# Patient Record
Sex: FEMALE | Race: BLACK OR AFRICAN AMERICAN | NOT HISPANIC OR LATINO | Employment: UNEMPLOYED | URBAN - METROPOLITAN AREA
[De-identification: names, ages, dates, MRNs, and addresses within clinical notes are randomized per-mention and may not be internally consistent; named-entity substitution may affect disease eponyms.]

---

## 2018-12-06 ENCOUNTER — HOSPITAL ENCOUNTER (EMERGENCY)
Facility: HOSPITAL | Age: 28
Discharge: HOME | End: 2018-12-06
Attending: EMERGENCY MEDICINE

## 2018-12-06 VITALS
DIASTOLIC BLOOD PRESSURE: 78 MMHG | WEIGHT: 130.06 LBS | RESPIRATION RATE: 18 BRPM | OXYGEN SATURATION: 100 % | TEMPERATURE: 99 F | SYSTOLIC BLOOD PRESSURE: 133 MMHG | HEIGHT: 62 IN | HEART RATE: 108 BPM | BODY MASS INDEX: 23.93 KG/M2

## 2018-12-06 DIAGNOSIS — K52.9 GASTROENTERITIS: Primary | ICD-10-CM

## 2018-12-06 LAB
DEPRECATED S PYO AG THROAT QL EIA: NEGATIVE
INFLUENZA A, MOLECULAR: NEGATIVE
INFLUENZA B, MOLECULAR: NEGATIVE
SPECIMEN SOURCE: NORMAL

## 2018-12-06 PROCEDURE — 87081 CULTURE SCREEN ONLY: CPT

## 2018-12-06 PROCEDURE — 87880 STREP A ASSAY W/OPTIC: CPT

## 2018-12-06 PROCEDURE — 99283 EMERGENCY DEPT VISIT LOW MDM: CPT

## 2018-12-06 PROCEDURE — 87502 INFLUENZA DNA AMP PROBE: CPT

## 2018-12-06 PROCEDURE — 25000003 PHARM REV CODE 250: Performed by: PHYSICIAN ASSISTANT

## 2018-12-06 RX ORDER — ONDANSETRON 4 MG/1
4 TABLET, ORALLY DISINTEGRATING ORAL ONCE
Status: COMPLETED | OUTPATIENT
Start: 2018-12-06 | End: 2018-12-06

## 2018-12-06 RX ORDER — PROMETHAZINE HYDROCHLORIDE 25 MG/1
25 TABLET ORAL EVERY 6 HOURS PRN
Qty: 15 TABLET | Refills: 0 | Status: ON HOLD | OUTPATIENT
Start: 2018-12-06 | End: 2019-03-15 | Stop reason: HOSPADM

## 2018-12-06 RX ORDER — IBUPROFEN 800 MG/1
800 TABLET ORAL
Status: COMPLETED | OUTPATIENT
Start: 2018-12-06 | End: 2018-12-06

## 2018-12-06 RX ORDER — DICYCLOMINE HYDROCHLORIDE 20 MG/1
20 TABLET ORAL 2 TIMES DAILY
Qty: 20 TABLET | Refills: 0 | Status: SHIPPED | OUTPATIENT
Start: 2018-12-06 | End: 2019-01-05

## 2018-12-06 RX ADMIN — ONDANSETRON 4 MG: 4 TABLET, ORALLY DISINTEGRATING ORAL at 09:12

## 2018-12-06 RX ADMIN — IBUPROFEN 800 MG: 800 TABLET ORAL at 09:12

## 2018-12-06 NOTE — ED PROVIDER NOTES
Encounter Date: 12/6/2018       History     Chief Complaint   Patient presents with    Generalized Body Aches     and fever sicne yesterday     The history is provided by the patient.   Emesis    This is a new problem. The current episode started yesterday. The problem occurs 2 - 4 times per day. The problem has been unchanged. The emesis has an appearance of stomach contents. Associated symptoms include chills, diarrhea, a fever and myalgias. Pertinent negatives include no abdominal pain, no arthralgias, no cough, no headaches, no sweats and no URI. Risk factors include ill contacts.     Review of patient's allergies indicates:  No Known Allergies  No past medical history on file.  No past surgical history on file.  No family history on file.  Social History     Tobacco Use    Smoking status: Not on file   Substance Use Topics    Alcohol use: Not on file    Drug use: Not on file     Review of Systems   Constitutional: Positive for chills and fever.   HENT: Negative for sore throat.    Eyes: Negative for photophobia and redness.   Respiratory: Negative for cough and shortness of breath.    Cardiovascular: Negative for chest pain.   Gastrointestinal: Positive for diarrhea and vomiting. Negative for abdominal pain and nausea.   Endocrine: Negative for polydipsia and polyphagia.   Genitourinary: Negative for dysuria.   Musculoskeletal: Positive for myalgias. Negative for arthralgias and back pain.   Skin: Negative for rash.   Neurological: Negative for weakness and headaches.   Hematological: Does not bruise/bleed easily.   Psychiatric/Behavioral: The patient is not nervous/anxious.    All other systems reviewed and are negative.      Physical Exam     Initial Vitals [12/06/18 0930]   BP Pulse Resp Temp SpO2   133/78 (!) 119 18 100.1 °F (37.8 °C) 100 %      MAP       --         Physical Exam    Nursing note and vitals reviewed.  Constitutional: Vital signs are normal. She appears well-developed and well-nourished.  No distress.   HENT:   Head: Normocephalic and atraumatic.   Right Ear: External ear normal.   Left Ear: External ear normal.   Nose: Nose normal.   Mouth/Throat: Oropharynx is clear and moist.   Eyes: Conjunctivae, EOM and lids are normal. Pupils are equal, round, and reactive to light.   Neck: Normal range of motion and full passive range of motion without pain. Neck supple.   Cardiovascular: Normal rate, regular rhythm, S1 normal, S2 normal, normal heart sounds, intact distal pulses and normal pulses.   Pulmonary/Chest: Breath sounds normal. No respiratory distress. She has no wheezes. She has no rales.   Abdominal: Soft. Normal appearance and bowel sounds are normal. She exhibits no distension. There is no tenderness.   Musculoskeletal: Normal range of motion.   Lymphadenopathy:     She has no cervical adenopathy.   Neurological: She is alert and oriented to person, place, and time. She has normal strength. No cranial nerve deficit or sensory deficit. Coordination and gait normal.   Skin: Skin is warm, dry and intact.   Psychiatric: She has a normal mood and affect. Her speech is normal and behavior is normal. Judgment and thought content normal. Cognition and memory are normal.         ED Course   Procedures  Labs Reviewed   INFLUENZA A & B BY MOLECULAR   THROAT SCREEN, RAPID   CULTURE, STREP A,  THROAT          Imaging Results    None                               Clinical Impression:   The encounter diagnosis was Gastroenteritis.      Disposition:   Disposition: Discharged  Condition: Stable                        RONI Hunt  12/06/18 1120

## 2018-12-08 LAB — BACTERIA THROAT CULT: NORMAL

## 2019-03-15 ENCOUNTER — ANESTHESIA (OUTPATIENT)
Dept: SURGERY | Facility: HOSPITAL | Age: 29
End: 2019-03-15

## 2019-03-15 ENCOUNTER — HOSPITAL ENCOUNTER (OUTPATIENT)
Facility: HOSPITAL | Age: 29
Discharge: HOME | End: 2019-03-15
Attending: EMERGENCY MEDICINE

## 2019-03-15 ENCOUNTER — ANESTHESIA EVENT (OUTPATIENT)
Dept: SURGERY | Facility: HOSPITAL | Age: 29
End: 2019-03-15

## 2019-03-15 VITALS
SYSTOLIC BLOOD PRESSURE: 118 MMHG | HEART RATE: 78 BPM | RESPIRATION RATE: 21 BRPM | BODY MASS INDEX: 24.09 KG/M2 | DIASTOLIC BLOOD PRESSURE: 68 MMHG | WEIGHT: 130.94 LBS | HEIGHT: 62 IN | OXYGEN SATURATION: 99 % | TEMPERATURE: 98 F

## 2019-03-15 DIAGNOSIS — N93.9 VAGINAL BLEEDING: ICD-10-CM

## 2019-03-15 DIAGNOSIS — O03.4 RETAINED PRODUCTS OF CONCEPTION AFTER MISCARRIAGE: ICD-10-CM

## 2019-03-15 DIAGNOSIS — O03.4 INCOMPLETE ABORTION: ICD-10-CM

## 2019-03-15 DIAGNOSIS — O03.9 MISCARRIAGE: Primary | ICD-10-CM

## 2019-03-15 PROBLEM — D62 ACUTE BLOOD LOSS ANEMIA: Status: ACTIVE | Noted: 2019-03-15

## 2019-03-15 LAB
ABO + RH BLD: NORMAL
ALBUMIN SERPL BCP-MCNC: 3.3 G/DL
ALP SERPL-CCNC: 55 U/L
ALT SERPL W/O P-5'-P-CCNC: 9 U/L
ANION GAP SERPL CALC-SCNC: 12 MMOL/L
AST SERPL-CCNC: 17 U/L
BASOPHILS # BLD AUTO: 0.01 K/UL
BASOPHILS NFR BLD: 0.1 %
BILIRUB SERPL-MCNC: 0.2 MG/DL
BUN SERPL-MCNC: 7 MG/DL
CALCIUM SERPL-MCNC: 8.8 MG/DL
CHLORIDE SERPL-SCNC: 102 MMOL/L
CO2 SERPL-SCNC: 21 MMOL/L
CREAT SERPL-MCNC: 0.7 MG/DL
DIFFERENTIAL METHOD: ABNORMAL
EOSINOPHIL # BLD AUTO: 0 K/UL
EOSINOPHIL NFR BLD: 0.1 %
ERYTHROCYTE [DISTWIDTH] IN BLOOD BY AUTOMATED COUNT: 15.6 %
ERYTHROCYTE [DISTWIDTH] IN BLOOD BY AUTOMATED COUNT: 15.6 %
EST. GFR  (AFRICAN AMERICAN): >60 ML/MIN/1.73 M^2
EST. GFR  (NON AFRICAN AMERICAN): >60 ML/MIN/1.73 M^2
GLUCOSE SERPL-MCNC: 192 MG/DL
HCG INTACT+B SERPL-ACNC: 95 MIU/ML
HCT VFR BLD AUTO: 26.3 %
HCT VFR BLD AUTO: 31.1 %
HGB BLD-MCNC: 10.2 G/DL
HGB BLD-MCNC: 8.6 G/DL
LYMPHOCYTES # BLD AUTO: 2.5 K/UL
LYMPHOCYTES NFR BLD: 12.5 %
MCH RBC QN AUTO: 23.9 PG
MCH RBC QN AUTO: 24 PG
MCHC RBC AUTO-ENTMCNC: 32.7 G/DL
MCHC RBC AUTO-ENTMCNC: 32.8 G/DL
MCV RBC AUTO: 73 FL
MCV RBC AUTO: 73 FL
MONOCYTES # BLD AUTO: 0.9 K/UL
MONOCYTES NFR BLD: 4.7 %
NEUTROPHILS # BLD AUTO: 16.3 K/UL
NEUTROPHILS NFR BLD: 82.6 %
PLATELET # BLD AUTO: 169 K/UL
PLATELET # BLD AUTO: 230 K/UL
PMV BLD AUTO: 10 FL
PMV BLD AUTO: 9.6 FL
POTASSIUM SERPL-SCNC: 3.6 MMOL/L
PROT SERPL-MCNC: 6.7 G/DL
RBC # BLD AUTO: 3.59 M/UL
RBC # BLD AUTO: 4.26 M/UL
SODIUM SERPL-SCNC: 135 MMOL/L
WBC # BLD AUTO: 10.16 K/UL
WBC # BLD AUTO: 19.69 K/UL

## 2019-03-15 PROCEDURE — 86901 BLOOD TYPING SEROLOGIC RH(D): CPT

## 2019-03-15 PROCEDURE — 80053 COMPREHEN METABOLIC PANEL: CPT

## 2019-03-15 PROCEDURE — 59812 PR SURG RX INCOMPLETE ABORTN: ICD-10-PCS | Mod: ,,, | Performed by: OBSTETRICS & GYNECOLOGY

## 2019-03-15 PROCEDURE — 63600175 PHARM REV CODE 636 W HCPCS: Performed by: EMERGENCY MEDICINE

## 2019-03-15 PROCEDURE — 96375 TX/PRO/DX INJ NEW DRUG ADDON: CPT

## 2019-03-15 PROCEDURE — 85027 COMPLETE CBC AUTOMATED: CPT

## 2019-03-15 PROCEDURE — 25000003 PHARM REV CODE 250: Performed by: NURSE ANESTHETIST, CERTIFIED REGISTERED

## 2019-03-15 PROCEDURE — 25000003 PHARM REV CODE 250: Performed by: ANESTHESIOLOGY

## 2019-03-15 PROCEDURE — 37000009 HC ANESTHESIA EA ADD 15 MINS: Performed by: OBSTETRICS & GYNECOLOGY

## 2019-03-15 PROCEDURE — 84702 CHORIONIC GONADOTROPIN TEST: CPT

## 2019-03-15 PROCEDURE — 59812 TREATMENT OF MISCARRIAGE: CPT | Mod: ,,, | Performed by: OBSTETRICS & GYNECOLOGY

## 2019-03-15 PROCEDURE — 99284 EMERGENCY DEPT VISIT MOD MDM: CPT | Mod: 25

## 2019-03-15 PROCEDURE — 88305 TISSUE SPECIMEN TO PATHOLOGY - SURGERY: ICD-10-PCS | Mod: 26,,, | Performed by: PATHOLOGY

## 2019-03-15 PROCEDURE — 88305 TISSUE EXAM BY PATHOLOGIST: CPT | Performed by: PATHOLOGY

## 2019-03-15 PROCEDURE — 63600175 PHARM REV CODE 636 W HCPCS: Performed by: NURSE ANESTHETIST, CERTIFIED REGISTERED

## 2019-03-15 PROCEDURE — 37000008 HC ANESTHESIA 1ST 15 MINUTES: Performed by: OBSTETRICS & GYNECOLOGY

## 2019-03-15 PROCEDURE — 25000003 PHARM REV CODE 250: Performed by: OBSTETRICS & GYNECOLOGY

## 2019-03-15 PROCEDURE — 36000705 HC OR TIME LEV I EA ADD 15 MIN: Performed by: OBSTETRICS & GYNECOLOGY

## 2019-03-15 PROCEDURE — 71000015 HC POSTOP RECOV 1ST HR: Performed by: OBSTETRICS & GYNECOLOGY

## 2019-03-15 PROCEDURE — 96374 THER/PROPH/DIAG INJ IV PUSH: CPT

## 2019-03-15 PROCEDURE — 63600175 PHARM REV CODE 636 W HCPCS: Performed by: ANESTHESIOLOGY

## 2019-03-15 PROCEDURE — 36000704 HC OR TIME LEV I 1ST 15 MIN: Performed by: OBSTETRICS & GYNECOLOGY

## 2019-03-15 PROCEDURE — 85025 COMPLETE CBC W/AUTO DIFF WBC: CPT

## 2019-03-15 PROCEDURE — 71000033 HC RECOVERY, INTIAL HOUR: Performed by: OBSTETRICS & GYNECOLOGY

## 2019-03-15 PROCEDURE — 88300 SURGICAL PATH GROSS: CPT | Mod: 26,59,, | Performed by: PATHOLOGY

## 2019-03-15 PROCEDURE — 88300 TISSUE SPECIMEN TO PATHOLOGY - SURGERY: ICD-10-PCS | Mod: 26,59,, | Performed by: PATHOLOGY

## 2019-03-15 PROCEDURE — 63600175 PHARM REV CODE 636 W HCPCS: Performed by: OBSTETRICS & GYNECOLOGY

## 2019-03-15 PROCEDURE — 88305 TISSUE EXAM BY PATHOLOGIST: CPT | Mod: 26,,, | Performed by: PATHOLOGY

## 2019-03-15 RX ORDER — SUCCINYLCHOLINE CHLORIDE 20 MG/ML
INJECTION INTRAMUSCULAR; INTRAVENOUS
Status: DISCONTINUED | OUTPATIENT
Start: 2019-03-15 | End: 2019-03-15

## 2019-03-15 RX ORDER — MORPHINE SULFATE 4 MG/ML
4 INJECTION, SOLUTION INTRAMUSCULAR; INTRAVENOUS
Status: COMPLETED | OUTPATIENT
Start: 2019-03-15 | End: 2019-03-15

## 2019-03-15 RX ORDER — PROPOFOL 10 MG/ML
VIAL (ML) INTRAVENOUS
Status: DISCONTINUED | OUTPATIENT
Start: 2019-03-15 | End: 2019-03-15

## 2019-03-15 RX ORDER — OXYCODONE HYDROCHLORIDE 5 MG/1
5 TABLET ORAL
Status: DISCONTINUED | OUTPATIENT
Start: 2019-03-15 | End: 2019-03-15 | Stop reason: HOSPADM

## 2019-03-15 RX ORDER — HYDROMORPHONE HYDROCHLORIDE 2 MG/ML
0.2 INJECTION, SOLUTION INTRAMUSCULAR; INTRAVENOUS; SUBCUTANEOUS EVERY 5 MIN PRN
Status: DISCONTINUED | OUTPATIENT
Start: 2019-03-15 | End: 2019-03-15 | Stop reason: HOSPADM

## 2019-03-15 RX ORDER — LIDOCAINE HCL/PF 100 MG/5ML
SYRINGE (ML) INTRAVENOUS
Status: DISCONTINUED | OUTPATIENT
Start: 2019-03-15 | End: 2019-03-15

## 2019-03-15 RX ORDER — ONDANSETRON 2 MG/ML
4 INJECTION INTRAMUSCULAR; INTRAVENOUS
Status: COMPLETED | OUTPATIENT
Start: 2019-03-15 | End: 2019-03-15

## 2019-03-15 RX ORDER — METOCLOPRAMIDE HYDROCHLORIDE 5 MG/ML
10 INJECTION INTRAMUSCULAR; INTRAVENOUS ONCE
Status: COMPLETED | OUTPATIENT
Start: 2019-03-15 | End: 2019-03-15

## 2019-03-15 RX ORDER — FENTANYL CITRATE 50 UG/ML
25 INJECTION, SOLUTION INTRAMUSCULAR; INTRAVENOUS EVERY 5 MIN PRN
Status: COMPLETED | OUTPATIENT
Start: 2019-03-15 | End: 2019-03-15

## 2019-03-15 RX ORDER — DIPHENHYDRAMINE HYDROCHLORIDE 50 MG/ML
25 INJECTION INTRAMUSCULAR; INTRAVENOUS ONCE
Status: COMPLETED | OUTPATIENT
Start: 2019-03-15 | End: 2019-03-15

## 2019-03-15 RX ORDER — CEFOXITIN SODIUM 2 G/50ML
2 INJECTION, SOLUTION INTRAVENOUS ONCE
Status: COMPLETED | OUTPATIENT
Start: 2019-03-15 | End: 2019-03-15

## 2019-03-15 RX ORDER — OXYTOCIN 10 [USP'U]/ML
INJECTION, SOLUTION INTRAMUSCULAR; INTRAVENOUS
Status: DISCONTINUED | OUTPATIENT
Start: 2019-03-15 | End: 2019-03-15

## 2019-03-15 RX ORDER — FENTANYL CITRATE 50 UG/ML
INJECTION, SOLUTION INTRAMUSCULAR; INTRAVENOUS
Status: DISCONTINUED | OUTPATIENT
Start: 2019-03-15 | End: 2019-03-15

## 2019-03-15 RX ORDER — ONDANSETRON 2 MG/ML
INJECTION INTRAMUSCULAR; INTRAVENOUS
Status: DISCONTINUED | OUTPATIENT
Start: 2019-03-15 | End: 2019-03-15

## 2019-03-15 RX ORDER — ONDANSETRON 2 MG/ML
4 INJECTION INTRAMUSCULAR; INTRAVENOUS DAILY PRN
Status: DISCONTINUED | OUTPATIENT
Start: 2019-03-15 | End: 2019-03-15 | Stop reason: HOSPADM

## 2019-03-15 RX ORDER — HYDROCODONE BITARTRATE AND ACETAMINOPHEN 5; 325 MG/1; MG/1
1 TABLET ORAL EVERY 4 HOURS PRN
Qty: 20 TABLET | Refills: 0 | Status: SHIPPED | OUTPATIENT
Start: 2019-03-15 | End: 2019-03-25

## 2019-03-15 RX ORDER — SODIUM CHLORIDE, SODIUM LACTATE, POTASSIUM CHLORIDE, CALCIUM CHLORIDE 600; 310; 30; 20 MG/100ML; MG/100ML; MG/100ML; MG/100ML
INJECTION, SOLUTION INTRAVENOUS CONTINUOUS
Status: DISCONTINUED | OUTPATIENT
Start: 2019-03-15 | End: 2019-03-15 | Stop reason: HOSPADM

## 2019-03-15 RX ORDER — MEPERIDINE HYDROCHLORIDE 50 MG/ML
12.5 INJECTION INTRAMUSCULAR; INTRAVENOUS; SUBCUTANEOUS EVERY 10 MIN PRN
Status: DISCONTINUED | OUTPATIENT
Start: 2019-03-15 | End: 2019-03-15 | Stop reason: HOSPADM

## 2019-03-15 RX ORDER — DOXYCYCLINE 100 MG/1
100 CAPSULE ORAL 2 TIMES DAILY
Qty: 20 CAPSULE | Refills: 0 | Status: SHIPPED | OUTPATIENT
Start: 2019-03-15 | End: 2024-01-04

## 2019-03-15 RX ORDER — SODIUM CHLORIDE, SODIUM LACTATE, POTASSIUM CHLORIDE, CALCIUM CHLORIDE 600; 310; 30; 20 MG/100ML; MG/100ML; MG/100ML; MG/100ML
INJECTION, SOLUTION INTRAVENOUS CONTINUOUS PRN
Status: DISCONTINUED | OUTPATIENT
Start: 2019-03-15 | End: 2019-03-15

## 2019-03-15 RX ADMIN — FENTANYL CITRATE 100 MCG: 50 INJECTION, SOLUTION INTRAMUSCULAR; INTRAVENOUS at 09:03

## 2019-03-15 RX ADMIN — MORPHINE SULFATE 4 MG: 4 INJECTION INTRAVENOUS at 03:03

## 2019-03-15 RX ADMIN — DIPHENHYDRAMINE HYDROCHLORIDE 25 MG: 50 INJECTION INTRAMUSCULAR; INTRAVENOUS at 11:03

## 2019-03-15 RX ADMIN — ONDANSETRON 4 MG: 2 INJECTION, SOLUTION INTRAMUSCULAR; INTRAVENOUS at 10:03

## 2019-03-15 RX ADMIN — FENTANYL CITRATE 25 MCG: 50 INJECTION INTRAMUSCULAR; INTRAVENOUS at 11:03

## 2019-03-15 RX ADMIN — Medication 50 MG: at 10:03

## 2019-03-15 RX ADMIN — METOCLOPRAMIDE 10 MG: 5 INJECTION, SOLUTION INTRAMUSCULAR; INTRAVENOUS at 12:03

## 2019-03-15 RX ADMIN — CEFOXITIN SODIUM 2 G: 2 INJECTION, SOLUTION INTRAVENOUS at 10:03

## 2019-03-15 RX ADMIN — ONDANSETRON 4 MG: 2 INJECTION INTRAMUSCULAR; INTRAVENOUS at 03:03

## 2019-03-15 RX ADMIN — LIDOCAINE HYDROCHLORIDE 100 MG: 20 INJECTION, SOLUTION INTRAVENOUS at 09:03

## 2019-03-15 RX ADMIN — PROPOFOL 200 MG: 10 INJECTION, EMULSION INTRAVENOUS at 09:03

## 2019-03-15 RX ADMIN — OXYTOCIN 20 UNITS: 10 INJECTION, SOLUTION INTRAMUSCULAR; INTRAVENOUS at 10:03

## 2019-03-15 RX ADMIN — FENTANYL CITRATE 100 MCG: 50 INJECTION, SOLUTION INTRAMUSCULAR; INTRAVENOUS at 10:03

## 2019-03-15 RX ADMIN — ONDANSETRON 4 MG: 2 INJECTION INTRAMUSCULAR; INTRAVENOUS at 11:03

## 2019-03-15 RX ADMIN — DOXYCYCLINE 100 MG: 100 INJECTION, POWDER, LYOPHILIZED, FOR SOLUTION INTRAVENOUS at 10:03

## 2019-03-15 RX ADMIN — SODIUM CHLORIDE, SODIUM LACTATE, POTASSIUM CHLORIDE, AND CALCIUM CHLORIDE: 600; 310; 30; 20 INJECTION, SOLUTION INTRAVENOUS at 09:03

## 2019-03-15 RX ADMIN — OXYCODONE HYDROCHLORIDE 5 MG: 5 TABLET ORAL at 11:03

## 2019-03-15 RX ADMIN — SUCCINYLCHOLINE CHLORIDE 100 MG: 20 INJECTION, SOLUTION INTRAMUSCULAR; INTRAVENOUS at 09:03

## 2019-03-15 NOTE — OR NURSING
Dr. Bryan notified of patients post Op CBC results. She states no new orders and patient is OK to be discharged home.

## 2019-03-15 NOTE — ED NOTES
Pt AAOx3, resting in bed, side rails up x 2, call bell within reach. NAD at this time. Will continue to monitor.    GENITOURINARY: Dark red vaginal bleeding present; Tenderness noted on palpation to pelvic region.

## 2019-03-15 NOTE — ED PROVIDER NOTES
"SCRIBE #1 NOTE: I, Miryam Dueñas, am scribing for, and in the presence of, Dinah Jeffries Do, MD. I have scribed the HPI, ROS, and PEx.     SCRIBE #2 NOTE: I, Marilu Mascorro, am scribing for, and in the presence of,  Dilan Bowers MD. I have scribed the remaining portions of the note not scribed by Scribe #1.        History     Chief Complaint   Patient presents with    Threatened Miscarriage     "in december I was told I would have a miscarriage, it isn't a baby, it's just an empty sac, and now I think it is happening, I think I am passing it." c/o lower pelvic pain/pressure, and vaginal bleeding       Review of patient's allergies indicates:  No Known Allergies      History of Present Illness   HPI    3/15/2019, 2:35 AM  History obtained from the patient      History of Present Illness: Gustavo Bledsoe is a 28 y.o. female patient who presents to the Emergency Department for vomiting which onset gradually today. Patient states she was told she was 6 weeks pregnant in December, but was told it was an empty sac and would be miscarrying, but states she has not had sxs until today. Symptoms are episodic and moderate in severity. No mitigating or exacerbating factors reported. Associated sxs include nausea, vaginal bleeding, and vaginal pain. Patient denies any fever, chills, abd pain, dysuria, hematuria, lightheadedness, HA, and all other sxs at this time. No further complaints or concerns at this time.     Arrival mode: Personal vehicle      PCP: Primary Doctor No        Past Medical History:  History reviewed. No pertinent past medical history.    Past Surgical History:  History reviewed. No pertinent surgical history.      Family History:  History reviewed. No pertinent family history.    Social History:  Social History     Tobacco Use    Smoking status: Unknown   Substance and Sexual Activity    Alcohol use: Unknown     Drug use: Unknown    Sexual activity: Unknown        Review of Systems   Review of Systems "   Constitutional: Negative for chills and fever.   HENT: Negative for sore throat.    Respiratory: Negative for shortness of breath.    Cardiovascular: Negative for chest pain.   Gastrointestinal: Positive for nausea and vomiting. Negative for abdominal pain.   Genitourinary: Positive for vaginal bleeding and vaginal pain. Negative for dysuria and hematuria.   Musculoskeletal: Negative for back pain.   Skin: Negative for rash.   Neurological: Negative for weakness, light-headedness and headaches.   Hematological: Does not bruise/bleed easily.   All other systems reviewed and are negative.       Physical Exam     Initial Vitals   BP Pulse Resp Temp SpO2   03/15/19 0213 03/15/19 0213 03/15/19 0213 03/15/19 0214 03/15/19 0213   (!) 132/90 84 20 97.7 °F (36.5 °C) 100 %      MAP       --                 Physical Exam  Nursing Notes and Vital Signs Reviewed.  Constitutional: Patient is in no acute distress. Well-developed and well-nourished.  Head: Atraumatic. Normocephalic.  Eyes: PERRL. EOM intact. Conjunctivae are not pale. No scleral icterus.  ENT: Mucous membranes are moist. Oropharynx is clear and symmetric.    Neck: Supple. Full ROM. No lymphadenopathy.  Cardiovascular: Regular rate. Regular rhythm. No murmurs, rubs, or gallops. Distal pulses are 2+ and symmetric.  Pulmonary/Chest: No respiratory distress. Clear to auscultation bilaterally. No wheezing or rales.  Abdominal: Soft and non-distended.  There is no tenderness.  No rebound, guarding, or rigidity.   Pelvic: A female chaperone was present for this examination. Nl external inspection. No lesions or abnormalities were visible on the labia majora or minora. Cervical os is closed. There is no CMT. There is blood in the vaginal vault. Heavy vaginal bleeding. No adnexal tenderness. No adnexal masses.  Musculoskeletal: Moves all extremities. No obvious deformities.   Skin: Warm and dry.  Neurological:  Alert, awake, and appropriate.  Normal speech.  No acute  "focal neurological deficits are appreciated.  Psychiatric: Normal affect. Good eye contact. Appropriate in content.     ED Course   Procedures  ED Vital Signs:  Vitals:    03/15/19 0213 03/15/19 0214 03/15/19 0343 03/15/19 0420   BP: (!) 132/90  102/79 120/76   Pulse: 84  98 83   Resp: 20  19 16   Temp:  97.7 °F (36.5 °C)  98.2 °F (36.8 °C)   TempSrc: Oral Oral  Oral   SpO2: 100%  98% 98%   Weight: 59.4 kg (130 lb 15.3 oz)      Height: 5' 2" (1.575 m)       03/15/19 0638 03/15/19 0705 03/15/19 0920 03/15/19 1055   BP: 107/61 118/65 (!) 108/55 (!) 108/57   Pulse: 76 75 89 78   Resp: 16 16 16 15   Temp: 98.7 °F (37.1 °C)   97.7 °F (36.5 °C)   TempSrc: Oral   Temporal   SpO2: 99% 96% 98% 100%   Weight:       Height:        03/15/19 1100 03/15/19 1105 03/15/19 1115 03/15/19 1130   BP: 110/63 121/71 113/65 (!) 110/59   Pulse: 76 91 79 80   Resp: 12 16 16 16   Temp:       TempSrc:       SpO2: 100% 100% 100% 96%   Weight:       Height:        03/15/19 1145   BP: (!) 112/59   Pulse: 73   Resp: 19   Temp:    TempSrc:    SpO2: 99%   Weight:    Height:        Abnormal Lab Results:  Labs Reviewed   CBC W/ AUTO DIFFERENTIAL - Abnormal; Notable for the following components:       Result Value    WBC 19.69 (*)     Hemoglobin 10.2 (*)     Hematocrit 31.1 (*)     MCV 73 (*)     MCH 23.9 (*)     RDW 15.6 (*)     Gran # (ANC) 16.3 (*)     Gran% 82.6 (*)     Lymph% 12.5 (*)     All other components within normal limits   COMPREHENSIVE METABOLIC PANEL - Abnormal; Notable for the following components:    Sodium 135 (*)     CO2 21 (*)     Glucose 192 (*)     Albumin 3.3 (*)     ALT 9 (*)     All other components within normal limits   HCG, QUANTITATIVE, PREGNANCY   PREGNANCY TEST, URINE RAPID   URINALYSIS, REFLEX TO URINE CULTURE   GROUP & RH        All Lab Results:  Results for orders placed or performed during the hospital encounter of 03/15/19   CBC W/ AUTO DIFFERENTIAL   Result Value Ref Range    WBC 19.69 (H) 3.90 - 12.70 K/uL    RBC " 4.26 4.00 - 5.40 M/uL    Hemoglobin 10.2 (L) 12.0 - 16.0 g/dL    Hematocrit 31.1 (L) 37.0 - 48.5 %    MCV 73 (L) 82 - 98 fL    MCH 23.9 (L) 27.0 - 31.0 pg    MCHC 32.8 32.0 - 36.0 g/dL    RDW 15.6 (H) 11.5 - 14.5 %    Platelets 230 150 - 350 K/uL    MPV 10.0 9.2 - 12.9 fL    Gran # (ANC) 16.3 (H) 1.8 - 7.7 K/uL    Lymph # 2.5 1.0 - 4.8 K/uL    Mono # 0.9 0.3 - 1.0 K/uL    Eos # 0.0 0.0 - 0.5 K/uL    Baso # 0.01 0.00 - 0.20 K/uL    Gran% 82.6 (H) 38.0 - 73.0 %    Lymph% 12.5 (L) 18.0 - 48.0 %    Mono% 4.7 4.0 - 15.0 %    Eosinophil% 0.1 0.0 - 8.0 %    Basophil% 0.1 0.0 - 1.9 %    Differential Method Automated    Comp. Metabolic Panel   Result Value Ref Range    Sodium 135 (L) 136 - 145 mmol/L    Potassium 3.6 3.5 - 5.1 mmol/L    Chloride 102 95 - 110 mmol/L    CO2 21 (L) 23 - 29 mmol/L    Glucose 192 (H) 70 - 110 mg/dL    BUN, Bld 7 6 - 20 mg/dL    Creatinine 0.7 0.5 - 1.4 mg/dL    Calcium 8.8 8.7 - 10.5 mg/dL    Total Protein 6.7 6.0 - 8.4 g/dL    Albumin 3.3 (L) 3.5 - 5.2 g/dL    Total Bilirubin 0.2 0.1 - 1.0 mg/dL    Alkaline Phosphatase 55 55 - 135 U/L    AST 17 10 - 40 U/L    ALT 9 (L) 10 - 44 U/L    Anion Gap 12 8 - 16 mmol/L    eGFR if African American >60 >60 mL/min/1.73 m^2    eGFR if non African American >60 >60 mL/min/1.73 m^2   hCG, quantitative   Result Value Ref Range    hCG Quant 95 See Text mIU/mL   CBC Without Differential   Result Value Ref Range    WBC 10.16 3.90 - 12.70 K/uL    RBC 3.59 (L) 4.00 - 5.40 M/uL    Hemoglobin 8.6 (L) 12.0 - 16.0 g/dL    Hematocrit 26.3 (L) 37.0 - 48.5 %    MCV 73 (L) 82 - 98 fL    MCH 24.0 (L) 27.0 - 31.0 pg    MCHC 32.7 32.0 - 36.0 g/dL    RDW 15.6 (H) 11.5 - 14.5 %    Platelets 169 150 - 350 K/uL    MPV 9.6 9.2 - 12.9 fL   ABO/Rh   Result Value Ref Range    Group & Rh O POS        Imaging Results:  Imaging Results          US OB Less Than 14 Wks First Gestation (Final result)  Result time 03/15/19 08:15:47    Final result by Cristian Kendrick III, MD (03/15/19  08:15:47)                 Impression:      1.  There is a single intrauterine gestation.  The size would correspond to an age of 13 weeks 1 day however no cardiac activity can be seen during the exam.    2.  Debris/echogenic/heterogeneous material above the gestational of indeterminate significance in etiology.  Clot?  Other?  Follow-up recommended.  Would recommend short-term follow-up with serial beta HCG as indicated.    3.  The ovaries cannot be seen.  No gross adnexal mass.      Electronically signed by: Cristian Kendrick MD  Date:    03/15/2019  Time:    08:15             Narrative:    EXAMINATION:  US OB LESS THAN 14 WEEKS FIRST GESTATION    CLINICAL HISTORY:  Abnormal uterine and vaginal bleeding, unspecified    FINDINGS:  Uterus measures 19 x 7.8 x 11.1 cm in diameter.  The ovaries cannot be seen.  Within the uterus is evidence of a gestational sac.  Single gestation identified with a crown-rump length of 7 cm corresponds to gestational age of 13 weeks 1 day.  The gestational sac is in the lower uterine segment.  Of note, the gestation was watched for an extended period of time and no heart tones could be detected.  There is echogenic density cephalad to the gestation of indeterminate significance in etiology.  No free fluid in the pelvis.                                           The Emergency Provider reviewed the vital signs and test results, which are outlined above.     ED Discussion     5:10 AM: Re-evaluated pt. Pt is resting comfortably and is in no acute distress.  D/w pt all pertinent results. D/w pt any concerns expressed at this time. Answered all questions. Pt expresses understanding at this time. Patient has heavy bleeding. Will US.    6:00 AM: Dr. Leal transfers care of pt to Dr. Bowers pending US results.    8:28 AM: Dr. Bowers discussed the pt's case with Dr. Bryan (Ob/Gyn) who will evaluate pt in ED.    9:12 AM: Dr. Bryan at bedside. Pt will be taken to surgery.    9:26 AM: Discussed case  with Dr. Bryan (Ob/Gyn). Dr. Bryan agrees with current care and management of pt and accepts admission.   Admitting Service: Ob/Gyn   Admitting Physician: Dr. Bryan  Admit to: OR    9:26 AM: Re-evaluated pt. I have discussed test results, shared treatment plan, and the need for admission with patient at bedside. Pt expresses understanding at this time and agree with all information. All questions answered. Pt has no further questions or concerns at this time. Pt is ready for admit.        ED Medication(s):  Medications   nozaseptin (NOZIN) nasal  (not administered)   lactated ringers infusion (not administered)   oxyCODONE immediate release tablet 5 mg (5 mg Oral Given 3/15/19 1136)   hydromorphone (PF) injection 0.2 mg (not administered)   meperidine injection 12.5 mg (not administered)   ondansetron injection 4 mg (4 mg Intravenous Given 3/15/19 1102)   promethazine (PHENERGAN) 6.25 mg in dextrose 5 % 50 mL IVPB (not administered)   ondansetron injection 4 mg (4 mg Intravenous Given 3/15/19 0324)   morphine injection 4 mg (4 mg Intravenous Given 3/15/19 0324)   ceFOXItin 2 g/50ml Dextrose IVPB (2 g Intravenous Given 3/15/19 1010)   doxycycline (VIBRAMYCIN) 100mg in dextrose 5% 250 mL IVPB (ready to mix) (100 mg Intravenous Given 3/15/19 1010)   fentaNYL injection 25 mcg (25 mcg Intravenous Given 3/15/19 1130)   diphenhydrAMINE injection 25 mg (25 mg Intravenous Given 3/15/19 1128)     Current Discharge Medication List      START taking these medications    Details   doxycycline (MONODOX) 100 MG capsule Take 1 capsule (100 mg total) by mouth 2 (two) times daily.  Qty: 20 capsule, Refills: 0      HYDROcodone-acetaminophen (NORCO) 5-325 mg per tablet Take 1 tablet by mouth every 4 (four) hours as needed for Pain.  Qty: 20 tablet, Refills: 0             Follow-up Information     Mayra Bryan MD In 4 weeks.    Specialties:  Obstetrics, Obstetrics and Gynecology  Why:  Postop check  Contact  information:  [address] [place] [address] DR Alex MEJIA [id number]  [phone]                        Medical Decision Making     Medical Decision Making:   Clinical Tests:   Lab Tests: Reviewed and Ordered  Radiological Study: Reviewed and Ordered             Scribe Attestation:   Scribe #1: I performed the above scribed service and the documentation accurately describes the services I performed. I attest to the accuracy of the note.     Attending:   Physician Attestation Statement for Scribe #1: I, Dinah Jeffries Do, MD, personally performed the services described in this documentation, as scribed by Miryam Dueñas, in my presence, and it is both accurate and complete.       Scribe Attestation:   Scribe #2: I performed the above scribed service and the documentation accurately describes the services I performed. I attest to the accuracy of the note.    Attending Attestation:           Physician Attestation for Scribe:    Physician Attestation Statement for Scribe #2: I, Dilan Bowers MD, reviewed documentation, as scribed by Marilu Mascorro in my presence, and it is both accurate and complete. I also acknowledge and confirm the content of the note done by Scribe #1.           Clinical Impression       ICD-10-CM ICD-9-CM   1. Miscarriage O03.9 634.90   2. Vaginal bleeding N93.9 623.8   3. Incomplete  O03.4 637.91   4. Retained products of conception after miscarriage O03.4 634.90       Disposition:   Disposition: Admitted  Condition: Fair         Dilan Bowers MD  03/15/19 1150

## 2019-03-15 NOTE — OP NOTE
OPERATIVE NOTE    03/15/2019    PREOPERATIVE DIAGNOSIS:    1.  Incomplete  at 13 weeks EGA    POSTOPERATIVE DIAGNOSIS    1.  Incomplete  at 13 weeks EGA    OPERATIVE PROCEDURE:  Suction Dilation and Curettage    SURGEON:  Mayra Bryan MD    ASSISTANT:  RONI Solomon - medically needed for procedure    ANESTHESIA:  General    ESTIMATED BLOOD LOSS:  300 ml    FINDINGS:  Large amount of retained POC's, 13 week fetus    COMPLICATIONS:  None    SPECIMENS:  POC's      PROCEDURE IN DETAIL:    The procedure was explained to the patient and informed consent was obtained.  The patient was brought to the operating room where general anesthesia was administered.  The patient was positioned in the dorsal lithotomy position and she was prepped and draped in the usual sterile manner.  An in and out catheterization was performed.   A time out was performed.  A weighted speculum was placed in the vagina and the anterior lip of the cervix was grasped with a single tooth tenaculum.  The uterus was sounded to 18cm.  The cervix was already dilated to a number 14 Hegar dilator.  The fetus was at the os and was gently removed with ring forceps.  A number 12 suction curette was then gently advanced into the uterus.  The suction curette was rotated to remove the retained products of conception.  Care was taken not to exceed a pressure of 40 mm Hg.  Following the suction curettage, a sharp curettage was also performed, with a gritty texture noted over the entire endometrial surface.  The suction curette was then placed again to remove all remaining blood clot.  Minimal bleeding was noted at the conclusion of the procedure.      At this time all instruments were removed and the procedure was concluded.  The patient tolerated the procedure well and was awakened from anesthesia and brought to the recovery room in stable condition.  All counts were correct x 2.

## 2019-03-15 NOTE — ANESTHESIA PREPROCEDURE EVALUATION
03/15/2019  Gustavo Bledsoe is a 28 y.o., female.    Anesthesia Evaluation    I have reviewed the Patient Summary Reports.    I have reviewed the Nursing Notes.   I have reviewed the Medications.     Review of Systems  Cardiovascular:  Cardiovascular Normal  ECG has been reviewed.    Pulmonary:  Pulmonary Normal    Renal/:  Renal/ Normal     Hepatic/GI:   N/V yesterday, none today   OB/GYN/PEDS:  Incomplete Ab   Neurological:  Neurology Normal    Endocrine:  Endocrine Normal        Physical Exam  General:  Well nourished    Airway/Jaw/Neck:  Airway Findings: Mouth Opening: Normal Tongue: Normal  General Airway Assessment: Adult  Mallampati: I       Chest/Lungs:  Chest/Lungs Findings: Clear to auscultation, Normal Respiratory Rate     Heart/Vascular:  Heart Findings: Rate: Normal  Rhythm: Regular Rhythm  Sounds: Normal        Mental Status:  Mental Status Findings:  Cooperative, Alert and Oriented         Anesthesia Plan  Type of Anesthesia, risks & benefits discussed:  Anesthesia Type:  general  Patient's Preference:   Intra-op Monitoring Plan: standard ASA monitors  Intra-op Monitoring Plan Comments:   Post Op Pain Control Plan: multimodal analgesia  Post Op Pain Control Plan Comments:   Induction:   IV  Beta Blocker:  Patient is not currently on a Beta-Blocker (No further documentation required).       Informed Consent: Patient understands risks and agrees with Anesthesia plan.  Questions answered. Anesthesia consent signed with patient.  ASA Score: 2  emergent   Day of Surgery Review of History & Physical: I have interviewed and examined the patient. I have reviewed the patient's H&P dated:  There are no significant changes.          Ready For Surgery From Anesthesia Perspective.

## 2019-03-15 NOTE — HPI
29 yo  who presents with complaint of vaginal bleeding and cramping for the past 2 days. She was diagnosed with a threatened miscarriage 3 months ago in the ED however never passed blood or products of conception. Now with vaginal bleeding including clots but no tissue passed. She reports abdominal cramping and nausea. No fever. No prior STD's. She denies chest pain, SOB, HA, or leg swelling.

## 2019-03-15 NOTE — ED NOTES
Pt AAOx3, resting in bed, side rails up x 2, call bell within reach. NAD at this time. Patient awaiting surgery at this time.

## 2019-03-15 NOTE — ASSESSMENT & PLAN NOTE
Patient actively bleeding with mild anemia 10/31, indication for definitive suction D&C. Currently hemodynamically stable.

## 2019-03-15 NOTE — H&P
"[facility] -   Obstetrics  History & Physical    Patient Name: Gustavo Bledsoe  MRN: [id number]  Admission Date: 3/15/2019  Primary Care Provider: Primary Doctor No    Subjective:     Principal Problem:Incomplete     History of Present Illness:  27 yo  who presents with complaint of vaginal bleeding and cramping for the past 2 days. She was diagnosed with a threatened miscarriage 3 months ago in the ED however never passed blood or products of conception. Now with vaginal bleeding including clots but no tissue passed. She reports abdominal cramping and nausea. No fever. No prior STD's. She denies chest pain, SOB, HA, or leg swelling.         Obstetric History       T0      L3     SAB0   TAB0   Ectopic0   Multiple0   Live Births0       # Outcome Date GA Lbr Dejuan/2nd Weight Sex Delivery Anes PTL Lv   5             4 AB            3 Para            2 Para            1                  History reviewed. No pertinent past medical history.  Past Surgical History:   Procedure Laterality Date     SECTION      CHOLECYSTECTOMY           (Not in a hospital admission)    Review of patient's allergies indicates:  No Known Allergies     Family History     None        Tobacco Use    Smoking status: Light Tobacco Smoker     Packs/day: 0.25     Types: Cigarettes   Substance and Sexual Activity    Alcohol use: No     Frequency: Never    Drug use: No    Sexual activity: Yes     Review of Systems   Constitutional: Positive for appetite change. Negative for activity change and fever.   Gastrointestinal: Positive for abdominal pain and nausea. Negative for bloating, constipation, diarrhea and vomiting.   Genitourinary: Positive for pelvic pain ("I feel pressure") and vaginal bleeding. Negative for vaginal discharge and vaginal odor.      Objective:     Vital Signs (Most Recent):  Temp: 98.7 °F (37.1 °C) (03/15/19 0638)  Pulse: 89 (03/15/19 0920)  Resp: 16 (03/15/19 " 0920)  BP: (!) 108/55 (03/15/19 0920)  SpO2: 98 % (03/15/19 0920) Vital Signs (24h Range):  Temp:  [97.7 °F (36.5 °C)-98.7 °F (37.1 °C)] 98.7 °F (37.1 °C)  Pulse:  [75-98] 89  Resp:  [16-20] 16  SpO2:  [96 %-100 %] 98 %  BP: (102-132)/(55-90) 108/55     Weight: 59.4 kg (130 lb 15.3 oz)  Body mass index is 23.95 kg/m².    No LMP recorded.    Physical Exam:   Constitutional: She is oriented to person, place, and time. She appears well-developed and well-nourished. No distress.    HENT:   Head: Normocephalic and atraumatic.       Pulmonary/Chest: Effort normal. No respiratory distress.        Abdominal: Soft. She exhibits no distension and no abdominal incision. There is no tenderness.     Genitourinary: Pelvic exam was performed with patient supine. There is lesion on the right labia. There is bleeding in the vagina.   Genitourinary Comments: Gravid uterus, cervix dilated 2 cm, able to palpate fetal parts        Uterus Size: 16 cm       Neurological: She is alert and oriented to person, place, and time.    Skin: Skin is warm and dry. No rash noted.        Laboratory:  I have personallly reviewed all pertinent lab results from the last 24 hours.    Diagnostic Results:  Labs: Reviewed  noted elevated WBC    Assessment/Plan:     28 y.o. female  at Unknown for:    * Incomplete     Patient with incomplete AB and concern for leukocytosis of 19 although remains afebrile, will start abx with cefoxitin and doxycycline. Plan for urgent suction D&C today. Reviewed procedure with patient and discussed indication as well as risks of the procedure, she expresses understanding and wishes to proceed with D&C.     Acute blood loss anemia    Patient actively bleeding with mild anemia 10/31, indication for definitive suction D&C. Currently hemodynamically stable.          Mirian Coto PA-C  Obstetrics  [facility] -

## 2019-03-15 NOTE — PLAN OF CARE
Gave home care instructions to patient and spouse, verbalized understanding.  All questions answered to the satisfaction of both.  To POV via WC, into POV without difficulty, distress or assist.

## 2019-03-15 NOTE — DISCHARGE INSTRUCTIONS
What to expect during recovery    Pain  · You will experience some level of pain after surgery.  Pain medication should help with the pain, but may not be able to eliminate it entirely.  Pain will decrease with time, and most pain will be gone by 4 to 6 weeks after surgery.  · Ice packs may help with pain and can reduce swelling.  · Your prescription pain medication may contain acetaminophen (Tylenol).  If so, you should not take additional acetaminophen (Tylenol) at the same time as your pain medication.   · Do not drive, operate machinery or power tools, or sign legal papers for 24 hours or as long as you are on your postoperative pain medication.   · Prescription pain medication should be taken only as directed.  We are not able to replace pain medication that has been lost or stolen.    Nausea/vomiting  · You may experience nausea or vomiting as a result of anesthesia or pain medication.  · If you experience severe nausea or you are unable to keep fluids down, contact your doctor.    Bleeding  · A small amount of clear or reddish drainage from the incision is normal after surgery.  · For mild bleeding from the incision, apply pressure for five minutes.  · If bleeding is severe or does not stop with pressure, contact your doctor.    Signs of infection  · Notify your doctor if you have the following signs of infection:  · Fever over 101 degrees  · Worsening redness around incsion  · Thick drainage from incision  · Foul smell from incision  · You may experience low fever/chills, this is normal after surgery.    Other post-operative symptoms  · It is safe to take over-the-counter medications for constipation, heartburn, sleep, or itching if needed.    · You may experience light-headedness, dizziness, and sleepiness following surgery. Please do not stay alone. A responsible adult should be with you for this 24 hour period.     Activity  · Try to rest and avoid strenuous activity, but also get out of bed regularly  unless your doctor has ordered strict bedrest.  · Several times every hour while you are awake, pump and flex your feet 5-6 times and do foot circles. This will help prevent blood clots.  · Several times every hour while you are awake, take 2 to 3 deep breaths and cough. If you had stomach surgery, hold a pillow or rolled towel firmly against your stomach before you cough. This will help with any pain the cough might cause.  · Do not smoke after surgery, it decreases your ability to heal and increases the risk of infection and pneumonia.    Nozin: Nasal   · Nozin reduces nasal germs to help decrease the risk of infection after surgery.  · Continue Nozin provided at discharge twice daily for 7 days or until the incision is healed.    · Place 4 drops to cotton swab tip and swab both nostril rims 6 times in each direction.  · See pamphlet for more information.     Diet  · Drink lots of fluids after surgery, unless otherwise instructed.  · You might not have much appetite at first.  Progress slowly to a normal diet unless given other specific diet instructions by your doctor.  Begin with liquids, then soup and crackers, working up to solid foods.  · Do not drink alcoholic beverages including beer for 24 hours or as long as you are on post-operative pain medication.    Follow-up after surgery  · You can contact your doctor through the patient portal using the [employer] shorty or at [email or web address].  · You can also contact your doctor at any time by calling [phone] for the [facility] on [facility], or [phone] for the [facility] on [facility].  · A nurse will be calling you sometime after surgery. Do not be alarmed. This is our way of finding out how you are doing.

## 2019-03-15 NOTE — HOSPITAL COURSE
Patient was brought in to the OR from the ED for urgent suction D&C for incomplete  and concern for risk of septic .   PREOPERATIVE DIAGNOSIS:    1.  Incomplete  at 13 weeks EGA  POSTOPERATIVE DIAGNOSIS    1.  Incomplete  at 13 weeks EGA  OPERATIVE PROCEDURE:  Suction Dilation and Curettage  SURGEON:  Mayra Bryan MD  ANESTHESIA:  General  ESTIMATED BLOOD LOSS:  300 ml  FINDINGS:  Large amount of retained POC's, 13 week fetus  COMPLICATIONS:  None     Patient tolerated well and was brought to recovery post procedure. She was noted to be hemodynamically stable with post op H/H 8.6/26.3. Due to leukocytosis on admission of 19 and risk of infection she received IV cefoxitin and doxycycline in the OR and was discharged on doxy x 10 days. Post procedure WBC down to 10.

## 2019-03-15 NOTE — TRANSFER OF CARE
"Anesthesia Transfer of Care Note    Patient: Gustavo Bledsoe    Procedure(s) Performed: Procedure(s) (LRB):  DILATION AND CURETTAGE, UTERUS, USING SUCTION (N/A)    Patient location: PACU    Anesthesia Type: general    Transport from OR: Transported from OR on room air with adequate spontaneous ventilation    Post pain: adequate analgesia    Post assessment: no apparent anesthetic complications and tolerated procedure well    Post vital signs: stable    Level of consciousness: awake and responds to stimulation    Nausea/Vomiting: no nausea/vomiting    Complications: none    Transfer of care protocol was followed      Last vitals:   Visit Vitals  BP (!) 108/55 (BP Location: Left arm, Patient Position: Lying)   Pulse 89   Temp 37.1 °C (98.7 °F) (Oral)   Resp 16   Ht 5' 2" (1.575 m)   Wt 59.4 kg (130 lb 15.3 oz)   SpO2 98%   Breastfeeding? No   BMI 23.95 kg/m²     "

## 2019-03-15 NOTE — ED NOTES
Pt reports not being able to uriate. Pt laying in bed. Reports when she stands she passes clots from her vaginal canal. Pt laying supine. Vss. Will continue to monitor.

## 2019-03-15 NOTE — ANESTHESIA RELEASE NOTE
"Anesthesia Release from PACU Note    Patient: Gustavo Bledsoe    Procedure(s) Performed: Procedure(s) (LRB):  DILATION AND CURETTAGE, UTERUS, USING SUCTION (N/A)    Anesthesia type: general    Post pain: Adequate analgesia    Post assessment: no apparent anesthetic complications, tolerated procedure well and no evidence of recall    Last Vitals:   Visit Vitals  BP (!) 108/55 (BP Location: Left arm, Patient Position: Lying)   Pulse 89   Temp 37.1 °C (98.7 °F) (Oral)   Resp 16   Ht 5' 2" (1.575 m)   Wt 59.4 kg (130 lb 15.3 oz)   SpO2 98%   Breastfeeding? No   BMI 23.95 kg/m²       Post vital signs: stable    Level of consciousness: awake, alert  and oriented    Nausea/Vomiting: no nausea/no vomiting    Complications: none    Airway Patency: patent    Respiratory: unassisted, spontaneous ventilation, room air    Cardiovascular: stable and blood pressure at baseline    Hydration: euvolemic  "

## 2019-03-15 NOTE — DISCHARGE SUMMARY
[facility] -   Obstetrics  Discharge Summary      Patient Name: Gustavo Bledsoe  MRN: [id number]  Admission Date: 3/15/2019  Hospital Length of Stay: 0 days  Discharge Date and Time:  03/15/2019 11:39 AM  Attending Physician: Swathi att. providers found   Discharging Provider: Mirian Coto PA-C  Primary Care Provider: Primary Doctor Swathi    HPI: 29 yo  who presents with complaint of vaginal bleeding and cramping for the past 2 days. She was diagnosed with a threatened miscarriage 3 months ago in the ED however never passed blood or products of conception. Now with vaginal bleeding including clots but no tissue passed. She reports abdominal cramping and nausea. No fever. No prior STD's. She denies chest pain, SOB, HA, or leg swelling.     Procedure(s) (LRB):  DILATION AND CURETTAGE, UTERUS, USING SUCTION (N/A)     Hospital Course:   Patient was brought in to the OR from the ED for urgent suction D&C for incomplete  and concern for risk of septic .   PREOPERATIVE DIAGNOSIS:    1.  Incomplete  at 13 weeks EGA  POSTOPERATIVE DIAGNOSIS    1.  Incomplete  at 13 weeks EGA  OPERATIVE PROCEDURE:  Suction Dilation and Curettage  SURGEON:  Mayra Bryan MD  ANESTHESIA:  General  ESTIMATED BLOOD LOSS:  300 ml  FINDINGS:  Large amount of retained POC's, 13 week fetus  COMPLICATIONS:  None     Patient tolerated well and was brought to recovery post procedure. She was noted to be hemodynamically stable with post op H/H 8.6/26.3. Due to leukocytosis on admission of 19 and risk of infection she received IV cefoxitin and doxycycline in the OR and was discharged on doxy x 10 days. Post procedure WBC down to 10.            Final Active Diagnoses:    Diagnosis Date Noted POA    Acute blood loss anemia [D62] 03/15/2019 Yes      Problems Resolved During this Admission:    Diagnosis Date Noted Date Resolved POA    PRINCIPAL PROBLEM:  Incomplete  [O03.4] 03/15/2019 03/15/2019 Yes         Labs: All labs within the past 24 hours have been reviewed        Immunizations     None            Pending Diagnostic Studies:     None          Discharged Condition: good    Disposition: Home or Self Care    Follow Up:  Follow-up Information     Mayra Bryan MD In 4 weeks.    Specialties:  Obstetrics, Obstetrics and Gynecology  Why:  Postop check  Contact information:  [address] [place] [address] [address] [id number]  [phone]                 Patient Instructions:      Other restrictions (specify):   Order Comments: Pelvic rest x 2 weeks     Call MD for:  temperature >100.4     Call MD for:  persistent nausea and vomiting or diarrhea     Call MD for:  severe uncontrolled pain     Call MD for:  difficulty breathing or increased cough     Call MD for:  persistent dizziness, light-headedness, or visual disturbances     Medications:  Current Discharge Medication List      START taking these medications    Details   doxycycline (MONODOX) 100 MG capsule Take 1 capsule (100 mg total) by mouth 2 (two) times daily.  Qty: 20 capsule, Refills: 0      HYDROcodone-acetaminophen (NORCO) 5-325 mg per tablet Take 1 tablet by mouth every 4 (four) hours as needed for Pain.  Qty: 20 tablet, Refills: 0         STOP taking these medications       promethazine (PHENERGAN) 25 MG tablet Comments:   Reason for Stopping:               Mirian Coto PA-C  Obstetrics  [facility] -

## 2019-03-15 NOTE — SUBJECTIVE & OBJECTIVE
"    Obstetric History       T0      L3     SAB0   TAB0   Ectopic0   Multiple0   Live Births0       # Outcome Date GA Lbr Dejuan/2nd Weight Sex Delivery Anes PTL Lv   5             4 AB            3 Para            2 Para            1                  History reviewed. No pertinent past medical history.  Past Surgical History:   Procedure Laterality Date     SECTION      CHOLECYSTECTOMY           (Not in a hospital admission)    Review of patient's allergies indicates:  No Known Allergies     Family History     None        Tobacco Use    Smoking status: Light Tobacco Smoker     Packs/day: 0.25     Types: Cigarettes   Substance and Sexual Activity    Alcohol use: No     Frequency: Never    Drug use: No    Sexual activity: Yes     Review of Systems   Constitutional: Positive for appetite change. Negative for activity change and fever.   Gastrointestinal: Positive for abdominal pain and nausea. Negative for bloating, constipation, diarrhea and vomiting.   Genitourinary: Positive for pelvic pain ("I feel pressure") and vaginal bleeding. Negative for vaginal discharge and vaginal odor.      Objective:     Vital Signs (Most Recent):  Temp: 98.7 °F (37.1 °C) (03/15/19 0638)  Pulse: 89 (03/15/19 09)  Resp: 16 (03/15/19 09)  BP: (!) 108/55 (03/15/19 09)  SpO2: 98 % (03/15/19 0920) Vital Signs (24h Range):  Temp:  [97.7 °F (36.5 °C)-98.7 °F (37.1 °C)] 98.7 °F (37.1 °C)  Pulse:  [75-98] 89  Resp:  [16-20] 16  SpO2:  [96 %-100 %] 98 %  BP: (102-132)/(55-90) 108/55     Weight: 59.4 kg (130 lb 15.3 oz)  Body mass index is 23.95 kg/m².    No LMP recorded.    Physical Exam:   Constitutional: She is oriented to person, place, and time. She appears well-developed and well-nourished. No distress.    HENT:   Head: Normocephalic and atraumatic.       Pulmonary/Chest: Effort normal. No respiratory distress.        Abdominal: Soft. She exhibits no distension and no abdominal incision. There is " no tenderness.     Genitourinary: Pelvic exam was performed with patient supine. There is lesion on the right labia. There is bleeding in the vagina.   Genitourinary Comments: Gravid uterus, cervix dilated 2 cm, able to palpate fetal parts        Uterus Size: 16 cm       Neurological: She is alert and oriented to person, place, and time.    Skin: Skin is warm and dry. No rash noted.        Laboratory:  I have personallly reviewed all pertinent lab results from the last 24 hours.    Diagnostic Results:  Labs: Reviewed  noted elevated WBC

## 2019-03-15 NOTE — ASSESSMENT & PLAN NOTE
Patient with incomplete AB and concern for leukocytosis of 19 although remains afebrile, will start abx with cefoxitin and doxycycline. Plan for urgent suction D&C today. Reviewed procedure with patient and discussed indication as well as risks of the procedure, she expresses understanding and wishes to proceed with D&C.

## 2019-03-15 NOTE — ANESTHESIA POSTPROCEDURE EVALUATION
"Anesthesia Post Evaluation    Patient: Gustavo Bledsoe    Procedure(s) Performed: Procedure(s) (LRB):  DILATION AND CURETTAGE, UTERUS, USING SUCTION (N/A)    Final Anesthesia Type: general  Patient location during evaluation: PACU  Patient participation: Yes- Able to Participate  Level of consciousness: awake  Post-procedure vital signs: reviewed and stable  Pain management: adequate  Airway patency: patent  PONV status at discharge: nausea (controlled)  Anesthetic complications: no      Cardiovascular status: blood pressure returned to baseline  Respiratory status: unassisted, spontaneous ventilation and room air  Hydration status: euvolemic  Follow-up not needed.        Visit Vitals  /68   Pulse 78   Temp 36.6 °C (97.8 °F) (Temporal)   Resp (!) 21   Ht 5' 2" (1.575 m)   Wt 59.4 kg (130 lb 15.3 oz)   SpO2 99%   Breastfeeding? No   BMI 23.95 kg/m²       Pain/Miguel Score: Pain Rating Prior to Med Admin: 4 (3/15/2019 11:36 AM)  Pain Rating Post Med Admin: 2 (3/15/2019 11:45 AM)  Miguel Score: 10 (3/15/2019 11:53 AM)        "

## 2024-01-22 ENCOUNTER — PATIENT MESSAGE (OUTPATIENT)
Dept: ADMINISTRATIVE | Facility: OTHER | Age: 34
End: 2024-01-22
Payer: MEDICAID

## 2024-03-07 ENCOUNTER — HOSPITAL ENCOUNTER (INPATIENT)
Facility: HOSPITAL | Age: 34
LOS: 3 days | Discharge: HOME | DRG: 833 | End: 2024-03-10
Attending: EMERGENCY MEDICINE | Admitting: OBSTETRICS & GYNECOLOGY
Payer: MEDICAID

## 2024-03-07 DIAGNOSIS — O23.02 PYELONEPHRITIS AFFECTING PREGNANCY IN SECOND TRIMESTER: Primary | ICD-10-CM

## 2024-03-07 DIAGNOSIS — O23.00 PYELONEPHRITIS AFFECTING PREGNANCY: ICD-10-CM

## 2024-03-07 PROBLEM — O09.899 HISTORY OF PRETERM DELIVERY, CURRENTLY PREGNANT: Status: ACTIVE | Noted: 2024-03-07

## 2024-03-07 PROBLEM — O99.019 IRON DEFICIENCY ANEMIA DURING PREGNANCY: Status: ACTIVE | Noted: 2024-03-07

## 2024-03-07 PROBLEM — O34.219 HISTORY OF CESAREAN DELIVERY AFFECTING PREGNANCY: Status: ACTIVE | Noted: 2024-03-07

## 2024-03-07 PROBLEM — D50.9 IRON DEFICIENCY ANEMIA DURING PREGNANCY: Status: ACTIVE | Noted: 2024-03-07

## 2024-03-07 PROBLEM — E87.6 HYPOKALEMIA: Status: ACTIVE | Noted: 2024-03-07

## 2024-03-07 LAB
ALBUMIN SERPL BCP-MCNC: 2.8 G/DL (ref 3.5–5.2)
ALP SERPL-CCNC: 66 U/L (ref 55–135)
ALT SERPL W/O P-5'-P-CCNC: 7 U/L (ref 10–44)
ANION GAP SERPL CALC-SCNC: 10 MMOL/L (ref 8–16)
AST SERPL-CCNC: 11 U/L (ref 10–40)
BACTERIA #/AREA URNS HPF: ABNORMAL /HPF
BACTERIA #/AREA URNS HPF: ABNORMAL /HPF
BASOPHILS # BLD AUTO: 0.04 K/UL (ref 0–0.2)
BASOPHILS NFR BLD: 0.3 % (ref 0–1.9)
BILIRUB SERPL-MCNC: 0.4 MG/DL (ref 0.1–1)
BILIRUB UR QL STRIP: NEGATIVE
BILIRUB UR QL STRIP: NEGATIVE
BUN SERPL-MCNC: 7 MG/DL (ref 6–20)
CALCIUM SERPL-MCNC: 8.7 MG/DL (ref 8.7–10.5)
CHLORIDE SERPL-SCNC: 103 MMOL/L (ref 95–110)
CLARITY UR: ABNORMAL
CLARITY UR: ABNORMAL
CO2 SERPL-SCNC: 22 MMOL/L (ref 23–29)
COLOR UR: YELLOW
COLOR UR: YELLOW
CREAT SERPL-MCNC: 0.6 MG/DL (ref 0.5–1.4)
DIFFERENTIAL METHOD BLD: ABNORMAL
EOSINOPHIL # BLD AUTO: 0 K/UL (ref 0–0.5)
EOSINOPHIL NFR BLD: 0.3 % (ref 0–8)
ERYTHROCYTE [DISTWIDTH] IN BLOOD BY AUTOMATED COUNT: 14.9 % (ref 11.5–14.5)
EST. GFR  (NO RACE VARIABLE): >60 ML/MIN/1.73 M^2
GLUCOSE SERPL-MCNC: 97 MG/DL (ref 70–110)
GLUCOSE UR QL STRIP: NEGATIVE
GLUCOSE UR QL STRIP: NEGATIVE
HCT VFR BLD AUTO: 28.9 % (ref 37–48.5)
HGB BLD-MCNC: 9.7 G/DL (ref 12–16)
HGB UR QL STRIP: ABNORMAL
HGB UR QL STRIP: ABNORMAL
HYALINE CASTS #/AREA URNS LPF: 0 /LPF
HYALINE CASTS #/AREA URNS LPF: 0 /LPF
IMM GRANULOCYTES # BLD AUTO: 0.16 K/UL (ref 0–0.04)
IMM GRANULOCYTES NFR BLD AUTO: 1.3 % (ref 0–0.5)
KETONES UR QL STRIP: NEGATIVE
KETONES UR QL STRIP: NEGATIVE
LACTATE SERPL-SCNC: 1.4 MMOL/L (ref 0.5–2.2)
LEUKOCYTE ESTERASE UR QL STRIP: ABNORMAL
LEUKOCYTE ESTERASE UR QL STRIP: ABNORMAL
LYMPHOCYTES # BLD AUTO: 1.1 K/UL (ref 1–4.8)
LYMPHOCYTES NFR BLD: 8.6 % (ref 18–48)
MCH RBC QN AUTO: 25.1 PG (ref 27–31)
MCHC RBC AUTO-ENTMCNC: 33.6 G/DL (ref 32–36)
MCV RBC AUTO: 75 FL (ref 82–98)
MICROSCOPIC COMMENT: ABNORMAL
MICROSCOPIC COMMENT: ABNORMAL
MONOCYTES # BLD AUTO: 0.3 K/UL (ref 0.3–1)
MONOCYTES NFR BLD: 2.5 % (ref 4–15)
NEUTROPHILS # BLD AUTO: 10.7 K/UL (ref 1.8–7.7)
NEUTROPHILS NFR BLD: 87 % (ref 38–73)
NITRITE UR QL STRIP: POSITIVE
NITRITE UR QL STRIP: POSITIVE
NRBC BLD-RTO: 0 /100 WBC
PH UR STRIP: 7 [PH] (ref 5–8)
PH UR STRIP: 7 [PH] (ref 5–8)
PLATELET # BLD AUTO: 267 K/UL (ref 150–450)
PMV BLD AUTO: 10.4 FL (ref 9.2–12.9)
POTASSIUM SERPL-SCNC: 3.4 MMOL/L (ref 3.5–5.1)
PROT SERPL-MCNC: 6.9 G/DL (ref 6–8.4)
PROT UR QL STRIP: ABNORMAL
PROT UR QL STRIP: ABNORMAL
RBC # BLD AUTO: 3.87 M/UL (ref 4–5.4)
RBC #/AREA URNS HPF: 8 /HPF (ref 0–4)
RBC #/AREA URNS HPF: 94 /HPF (ref 0–4)
SODIUM SERPL-SCNC: 135 MMOL/L (ref 136–145)
SP GR UR STRIP: 1.01 (ref 1–1.03)
SP GR UR STRIP: 1.02 (ref 1–1.03)
SQUAMOUS #/AREA URNS HPF: 5 /HPF
SQUAMOUS #/AREA URNS HPF: >100 /HPF
UNIDENT CRYS URNS QL MICRO: ABNORMAL
URN SPEC COLLECT METH UR: ABNORMAL
URN SPEC COLLECT METH UR: ABNORMAL
UROBILINOGEN UR STRIP-ACNC: NEGATIVE EU/DL
UROBILINOGEN UR STRIP-ACNC: NEGATIVE EU/DL
WBC # BLD AUTO: 12.31 K/UL (ref 3.9–12.7)
WBC #/AREA URNS HPF: >100 /HPF (ref 0–5)
WBC #/AREA URNS HPF: >100 /HPF (ref 0–5)
WBC CLUMPS URNS QL MICRO: ABNORMAL
WBC CLUMPS URNS QL MICRO: ABNORMAL
YEAST URNS QL MICRO: ABNORMAL

## 2024-03-07 PROCEDURE — 87077 CULTURE AEROBIC IDENTIFY: CPT | Mod: 59 | Performed by: EMERGENCY MEDICINE

## 2024-03-07 PROCEDURE — 25000003 PHARM REV CODE 250: Performed by: EMERGENCY MEDICINE

## 2024-03-07 PROCEDURE — 87040 BLOOD CULTURE FOR BACTERIA: CPT | Mod: 59 | Performed by: EMERGENCY MEDICINE

## 2024-03-07 PROCEDURE — 87086 URINE CULTURE/COLONY COUNT: CPT | Mod: 59 | Performed by: EMERGENCY MEDICINE

## 2024-03-07 PROCEDURE — 11000001 HC ACUTE MED/SURG PRIVATE ROOM

## 2024-03-07 PROCEDURE — 87088 URINE BACTERIA CULTURE: CPT | Mod: 59 | Performed by: EMERGENCY MEDICINE

## 2024-03-07 PROCEDURE — 63600175 PHARM REV CODE 636 W HCPCS: Performed by: EMERGENCY MEDICINE

## 2024-03-07 PROCEDURE — 99285 EMERGENCY DEPT VISIT HI MDM: CPT

## 2024-03-07 PROCEDURE — 96367 TX/PROPH/DG ADDL SEQ IV INF: CPT

## 2024-03-07 PROCEDURE — 25000003 PHARM REV CODE 250: Performed by: OBSTETRICS & GYNECOLOGY

## 2024-03-07 PROCEDURE — 80053 COMPREHEN METABOLIC PANEL: CPT | Performed by: EMERGENCY MEDICINE

## 2024-03-07 PROCEDURE — 96365 THER/PROPH/DIAG IV INF INIT: CPT

## 2024-03-07 PROCEDURE — 96361 HYDRATE IV INFUSION ADD-ON: CPT

## 2024-03-07 PROCEDURE — 83605 ASSAY OF LACTIC ACID: CPT | Performed by: EMERGENCY MEDICINE

## 2024-03-07 PROCEDURE — 81000 URINALYSIS NONAUTO W/SCOPE: CPT | Performed by: EMERGENCY MEDICINE

## 2024-03-07 PROCEDURE — 87186 SC STD MICRODIL/AGAR DIL: CPT | Mod: 59 | Performed by: EMERGENCY MEDICINE

## 2024-03-07 PROCEDURE — 85025 COMPLETE CBC W/AUTO DIFF WBC: CPT | Performed by: EMERGENCY MEDICINE

## 2024-03-07 RX ORDER — SODIUM CHLORIDE 9 MG/ML
INJECTION, SOLUTION INTRAVENOUS CONTINUOUS
Status: DISCONTINUED | OUTPATIENT
Start: 2024-03-07 | End: 2024-03-09

## 2024-03-07 RX ORDER — SODIUM CHLORIDE 0.9 % (FLUSH) 0.9 %
10 SYRINGE (ML) INJECTION
Status: DISCONTINUED | OUTPATIENT
Start: 2024-03-07 | End: 2024-03-10 | Stop reason: HOSPADM

## 2024-03-07 RX ORDER — AMOXICILLIN 250 MG
1 CAPSULE ORAL NIGHTLY PRN
Status: DISCONTINUED | OUTPATIENT
Start: 2024-03-07 | End: 2024-03-09

## 2024-03-07 RX ORDER — METRONIDAZOLE 500 MG/1
500 TABLET ORAL 2 TIMES DAILY
COMMUNITY
Start: 2024-02-28 | End: 2024-03-07 | Stop reason: ALTCHOICE

## 2024-03-07 RX ORDER — DIPHENHYDRAMINE HCL 25 MG
25 CAPSULE ORAL EVERY 4 HOURS PRN
Status: DISCONTINUED | OUTPATIENT
Start: 2024-03-07 | End: 2024-03-10 | Stop reason: HOSPADM

## 2024-03-07 RX ORDER — ONDANSETRON 8 MG/1
8 TABLET, ORALLY DISINTEGRATING ORAL EVERY 8 HOURS PRN
Status: DISCONTINUED | OUTPATIENT
Start: 2024-03-07 | End: 2024-03-10 | Stop reason: HOSPADM

## 2024-03-07 RX ORDER — HYDROCODONE BITARTRATE AND ACETAMINOPHEN 5; 325 MG/1; MG/1
1 TABLET ORAL EVERY 6 HOURS PRN
Status: DISCONTINUED | OUTPATIENT
Start: 2024-03-07 | End: 2024-03-09

## 2024-03-07 RX ORDER — HYDROCODONE BITARTRATE AND ACETAMINOPHEN 10; 325 MG/1; MG/1
1 TABLET ORAL EVERY 6 HOURS PRN
Status: DISCONTINUED | OUTPATIENT
Start: 2024-03-07 | End: 2024-03-07

## 2024-03-07 RX ORDER — DIPHENHYDRAMINE HYDROCHLORIDE 50 MG/ML
25 INJECTION INTRAMUSCULAR; INTRAVENOUS EVERY 4 HOURS PRN
Status: DISCONTINUED | OUTPATIENT
Start: 2024-03-07 | End: 2024-03-10 | Stop reason: HOSPADM

## 2024-03-07 RX ORDER — ACETAMINOPHEN 325 MG/1
650 TABLET ORAL EVERY 6 HOURS PRN
Status: DISCONTINUED | OUTPATIENT
Start: 2024-03-07 | End: 2024-03-10 | Stop reason: HOSPADM

## 2024-03-07 RX ORDER — POTASSIUM CHLORIDE 20 MEQ/1
40 TABLET, EXTENDED RELEASE ORAL EVERY 4 HOURS
Status: COMPLETED | OUTPATIENT
Start: 2024-03-07 | End: 2024-03-07

## 2024-03-07 RX ORDER — HYDROCODONE BITARTRATE AND ACETAMINOPHEN 10; 325 MG/1; MG/1
1 TABLET ORAL EVERY 6 HOURS PRN
Status: DISCONTINUED | OUTPATIENT
Start: 2024-03-07 | End: 2024-03-09

## 2024-03-07 RX ORDER — SIMETHICONE 80 MG
1 TABLET,CHEWABLE ORAL EVERY 6 HOURS PRN
Status: DISCONTINUED | OUTPATIENT
Start: 2024-03-07 | End: 2024-03-10 | Stop reason: HOSPADM

## 2024-03-07 RX ORDER — PRENATAL WITH FERROUS FUM AND FOLIC ACID 3080; 920; 120; 400; 22; 1.84; 3; 20; 10; 1; 12; 200; 27; 25; 2 [IU]/1; [IU]/1; MG/1; [IU]/1; MG/1; MG/1; MG/1; MG/1; MG/1; MG/1; UG/1; MG/1; MG/1; MG/1; MG/1
1 TABLET ORAL DAILY
Status: DISCONTINUED | OUTPATIENT
Start: 2024-03-07 | End: 2024-03-10 | Stop reason: HOSPADM

## 2024-03-07 RX ADMIN — HYDROCODONE BITARTRATE AND ACETAMINOPHEN 1 TABLET: 10; 325 TABLET ORAL at 09:03

## 2024-03-07 RX ADMIN — HYDROCODONE BITARTRATE AND ACETAMINOPHEN 1 TABLET: 10; 325 TABLET ORAL at 02:03

## 2024-03-07 RX ADMIN — PRENATAL VITAMINS-IRON FUMARATE 27 MG IRON-FOLIC ACID 0.8 MG TABLET 1 TABLET: at 12:03

## 2024-03-07 RX ADMIN — CEFTRIAXONE 1 G: 1 INJECTION, POWDER, FOR SOLUTION INTRAMUSCULAR; INTRAVENOUS at 05:03

## 2024-03-07 RX ADMIN — POTASSIUM CHLORIDE 40 MEQ: 1500 TABLET, EXTENDED RELEASE ORAL at 10:03

## 2024-03-07 RX ADMIN — POTASSIUM CHLORIDE 40 MEQ: 1500 TABLET, EXTENDED RELEASE ORAL at 08:03

## 2024-03-07 RX ADMIN — SODIUM CHLORIDE 2000 ML: 9 INJECTION, SOLUTION INTRAVENOUS at 04:03

## 2024-03-07 RX ADMIN — PROMETHAZINE HYDROCHLORIDE 12.5 MG: 25 INJECTION INTRAMUSCULAR; INTRAVENOUS at 04:03

## 2024-03-07 RX ADMIN — ONDANSETRON 8 MG: 8 TABLET, ORALLY DISINTEGRATING ORAL at 09:03

## 2024-03-07 RX ADMIN — SODIUM CHLORIDE: 9 INJECTION, SOLUTION INTRAVENOUS at 06:03

## 2024-03-07 NOTE — HPI
Gustavo Bledsoe is a 33 y.o.  female with IUP at 23w6d weeks gestation ([facility])who presents to ER with complaint of working right flank pain. Reports UTI symptoms for the last 2 days that worsen prompting visit to ER. Denies fever, chills, nausea, vomiting. This IUP is complicated by hx of  delivery, history of previous C/D, history indicated cerclage placement this IUP.  Patient denies abdominal pain, vaginal bleeding or leakage of fluid. Reports active fetal movement. FHT verified per ER provider.

## 2024-03-07 NOTE — Clinical Note
Diagnosis: Pyelonephritis affecting pregnancy [5611894]   Future Attending Provider: SUE CASTILLO [[id number]]

## 2024-03-07 NOTE — PHARMACY MED REC
"Admission Medication History     The home medication history was taken by Jennifer Sandoval.    You may go to "Admission" then "Reconcile Home Medications" tabs to review and/or act upon these items.     The home medication list has been updated by the Pharmacy department.   Please read ALL comments highlighted in yellow.   Please address this information as you see fit.    Feel free to contact us if you have any questions or require assistance.      The medications listed below were removed from the home medication list. Please reorder if appropriate:  Patient reports no longer taking the following medication(s):  Metronidazole 500 mg        Medications listed below were obtained from: Patient/family and Analytic software- pinion-pins  (Not in a hospital admission)      LAST MED REC COMPLETED:     Jennifer Sandoval  [id number]      Current Outpatient Medications on File Prior to Encounter   Medication Sig Dispense Refill Last Dose    ondansetron (ZOFRAN-ODT) 4 MG TbDL Take 1 tablet (4 mg total) by mouth every 6 (six) hours as needed (nasuea). (Patient not taking: Reported on 3/7/2024) 30 tablet 1 Not Taking    prenatal vit103-iron fum-folic () 27 mg iron- 1 mg Tab Take 1 tablet by mouth once daily. (Patient not taking: Reported on 3/7/2024) 30 tablet 11 Not Taking                           .          "

## 2024-03-07 NOTE — ED PROVIDER NOTES
SCRIBE #1 NOTE: I, Reagan Hector, am scribing for, and in the presence of, Maria Dolores Mascorro MD. I have scribed the entire note     History     Chief Complaint   Patient presents with    Flank Pain     Right-sided flank pain that radiates to RLQ abdomen, urinary frequency and burning, 23 weeks pregnant, +n/v     Review of patient's allergies indicates:  No Known Allergies      History of Present Illness     HPI    3/7/2024, 4:40 AM  History obtained from the patient      History of Present Illness: Gustavo Bledsoe is a 33 y.o. female patient with a PMHx of cholecystectomy who presents to the Emergency Department for evaluation of right sided flank pain which onset gradually 2 days ago. She states she is 23 weeks and 6 days pregnant and denies any vaginal bleeding. She describes her pain to radiate to her RLQ abdomen. Symptoms are constant and moderate in severity. Aggravating factors include lying in a supine position and alleviating factors includes vomiting. Associated sxs include nausea, chills, dysuria, and urinary frequency. Patient denies any constipation, diarrhea, fever, CP, SOB, HA, and all other sxs at this time. No further complaints or concerns at this time.       Arrival mode: Personal vehicle      PCP: No, Primary Doctor       OB History    Para Term  AB Living   6 4 2 2 1 3   SAB IAB Ectopic Multiple Live Births   0 0 0 0 4      # Outcome Date GA Lbr Dejuan/2nd Weight Sex Delivery Anes PTL Lv   6 Current            5 AB 2018           4  18 34w0d  2.268 kg M CS-LTranv EPI, Gen Y VEGA      Complications: Failure to Progress in Second Stage   3 Term 16 40w0d  3.175 kg M Vag-Spont EPI  VEGA   2 Term 04/12/10 37w0d  2.722 kg F Vag-Spont EPI N VEGA   1  09 23w0d  0.454 kg F Vag-Spont   ND        Past Medical History:  History reviewed. No pertinent past medical history.    Past Surgical History:  Past Surgical History:   Procedure Laterality Date     SECTION       CHOLECYSTECTOMY      DILATION AND CURETTAGE OF UTERUS USING SUCTION N/A 3/15/2019    Procedure: DILATION AND CURETTAGE, UTERUS, USING SUCTION;  Surgeon: Mayra Bryan MD;  Location: [facility];  Service: OB/GYN;  Laterality: N/A;         Family History:  No family history on file.    Social History:  Social History     Tobacco Use    Smoking status: Light Smoker     Current packs/day: 0.25     Types: Cigarettes    Smokeless tobacco: Not on file   Substance and Sexual Activity    Alcohol use: No    Drug use: No    Sexual activity: Yes        Review of Systems     Review of Systems   Constitutional:  Positive for chills. Negative for fever.   HENT:  Negative for sore throat.    Respiratory:  Negative for shortness of breath.    Cardiovascular:  Negative for chest pain.   Gastrointestinal:  Positive for abdominal pain (RLQ), nausea and vomiting. Negative for constipation and diarrhea.   Genitourinary:  Positive for dysuria, flank pain (right) and frequency. Negative for vaginal bleeding.   Musculoskeletal:  Negative for back pain.   Skin:  Negative for rash.   Neurological:  Negative for weakness and headaches.   Hematological:  Does not bruise/bleed easily.   All other systems reviewed and are negative.       Physical Exam     Initial Vitals [03/07/24 0406]   BP Pulse Resp Temp SpO2   (!) 132/59 (!) 127 18 99.6 °F (37.6 °C) 99 %      MAP       --          Physical Exam   Nursing Notes and Vital Signs Reviewed.  Constitutional: Patient is in no acute distress. Well-developed and well-nourished.  Head: Atraumatic. Normocephalic.  Eyes: PERRL. EOM intact. Conjunctivae are not pale. No scleral icterus.  ENT: Mucous membranes are moist. Oropharynx is clear and symmetric.    Neck: Supple. Full ROM. No lymphadenopathy.  Cardiovascular: Regular rate. Regular rhythm. No murmurs, rubs, or gallops. Distal pulses are 2+ and symmetric.  Pulmonary/Chest: No respiratory distress. Clear to auscultation bilaterally. No  wheezing or rales.  Abdominal: Soft. Gravid. There is RLQ tenderness.  No rebound, guarding, or rigidity. Good bowel sounds.  Genitourinary: Right CVA tenderness  Musculoskeletal: Moves all extremities. No obvious deformities. No edema. No calf tenderness.  Skin: Warm and dry.  Neurological:  Alert, awake, and appropriate.  Normal speech.  No acute focal neurological deficits are appreciated.  Psychiatric: Normal affect. Good eye contact. Appropriate in content.     ED Course   Critical Care    Date/Time: 3/7/2024 6:04 AM    Performed by: Maria Dolores Mascorro MD  Authorized by: Maria Dolores Mascorro MD  Direct patient critical care time: 20 minutes  Additional history critical care time: 10 minutes  Ordering / reviewing critical care time: 5 minutes  Documentation critical care time: 5 minutes  Consulting other physicians critical care time: 5 minutes  Total critical care time (exclusive of procedural time) : 45 minutes  Critical care time was exclusive of separately billable procedures and treating other patients and teaching time.  Critical care was necessary to treat or prevent imminent or life-threatening deterioration of the following conditions: Pyelonephritis and pregnancy.  Critical care was time spent personally by me on the following activities: blood draw for specimens, discussions with consultants, development of treatment plan with patient or surrogate, interpretation of cardiac output measurements, evaluation of patient's response to treatment, examination of patient, obtaining history from patient or surrogate, ordering and performing treatments and interventions, ordering and review of laboratory studies, pulse oximetry, re-evaluation of patient's condition and review of old charts.        ED Vital Signs:  Vitals:    03/07/24 1519 03/07/24 1530 03/07/24 1922 03/07/24 1951   BP:  (!) 101/57 104/67 (!) 106/54   Pulse: 101 101 97 102   Resp:   18 18   Temp:    97.6 °F (36.4 °C)   TempSrc:    Oral   SpO2: 96% 95%  "100% 100%   Weight:    68.4 kg (150 lb 12.7 oz)   Height:    5' 6" (1.676 m)    03/07/24 1953 03/07/24 2033 03/07/24 2130 03/07/24 2300   BP:       Pulse: 91 109 (!) 114 (!) 113   Resp: 16  18 16   Temp:       TempSrc:       SpO2:       Weight:       Height:        03/08/24 0014 03/08/24 0105 03/08/24 0337 03/08/24 0339   BP: (!) 104/53   116/75   Pulse: (!) 117 (!) 115  (!) 115   Resp: 18 18 16 16   Temp: 98.5 °F (36.9 °C)   (!) 101.7 °F (38.7 °C)   TempSrc: Oral   Oral   SpO2: 95%   99%   Weight:       Height:        03/08/24 0341 03/08/24 0400 03/08/24 0452   BP:      Pulse:  (!) 117 110   Resp:   16   Temp: (!) 101.7 °F (38.7 °C)  99.4 °F (37.4 °C)   TempSrc:   Oral   SpO2:      Weight:      Height:          Abnormal Lab Results:  Labs Reviewed   URINALYSIS, REFLEX TO URINE CULTURE - Abnormal; Notable for the following components:       Result Value    Appearance, UA Cloudy (*)     Protein, UA 2+ (*)     Occult Blood UA 2+ (*)     Nitrite, UA Positive (*)     Leukocytes, UA 3+ (*)     All other components within normal limits    Narrative:     Specimen Source->Urine   CBC W/ AUTO DIFFERENTIAL - Abnormal; Notable for the following components:    RBC 3.87 (*)     Hemoglobin 9.7 (*)     Hematocrit 28.9 (*)     MCV 75 (*)     MCH 25.1 (*)     RDW 14.9 (*)     Immature Granulocytes 1.3 (*)     Gran # (ANC) 10.7 (*)     Immature Grans (Abs) 0.16 (*)     Gran % 87.0 (*)     Lymph % 8.6 (*)     Mono % 2.5 (*)     All other components within normal limits   COMPREHENSIVE METABOLIC PANEL - Abnormal; Notable for the following components:    Sodium 135 (*)     Potassium 3.4 (*)     CO2 22 (*)     Albumin 2.8 (*)     ALT 7 (*)     All other components within normal limits   URINALYSIS, REFLEX TO URINE CULTURE - Abnormal; Notable for the following components:    Appearance, UA Hazy (*)     Protein, UA 1+ (*)     Occult Blood UA 2+ (*)     Nitrite, UA Positive (*)     Leukocytes, UA 3+ (*)     All other components within " normal limits    Narrative:     Specimen Source->Urine   URINALYSIS MICROSCOPIC - Abnormal; Notable for the following components:    RBC, UA 94 (*)     WBC, UA >100 (*)     WBC Clumps, UA Many (*)     Bacteria Many (*)     All other components within normal limits    Narrative:     Specimen Source->Urine   URINALYSIS MICROSCOPIC - Abnormal; Notable for the following components:    RBC, UA 8 (*)     WBC, UA >100 (*)     WBC Clumps, UA Many (*)     Yeast, UA Moderate (*)     All other components within normal limits    Narrative:     Specimen Source->Urine   CULTURE, BLOOD    Narrative:     Aerobic and anaerobic   CULTURE, BLOOD    Narrative:     Aerobic and anaerobic   CULTURE, URINE   CULTURE, URINE   LACTIC ACID, PLASMA        All Lab Results:  Results for orders placed or performed during the hospital encounter of 03/07/24   Blood culture x two cultures. Draw prior to antibiotics.    Specimen: Peripheral, Forearm, Right; Blood   Result Value Ref Range    Blood Culture, Routine No Growth to date    Blood culture x two cultures. Draw prior to antibiotics.    Specimen: Peripheral, Forearm, Left; Blood   Result Value Ref Range    Blood Culture, Routine No Growth to date    Urinalysis, Reflex to Urine Culture Urine, Clean Catch    Specimen: Urine   Result Value Ref Range    Specimen UA Urine, Clean Catch     Color, UA Yellow Yellow, Straw, Irina    Appearance, UA Cloudy (A) Clear    pH, UA 7.0 5.0 - 8.0    Specific Gravity, UA 1.020 1.005 - 1.030    Protein, UA 2+ (A) Negative    Glucose, UA Negative Negative    Ketones, UA Negative Negative    Bilirubin (UA) Negative Negative    Occult Blood UA 2+ (A) Negative    Nitrite, UA Positive (A) Negative    Urobilinogen, UA Negative <2.0 EU/dL    Leukocytes, UA 3+ (A) Negative   CBC auto differential   Result Value Ref Range    WBC 12.31 3.90 - 12.70 K/uL    RBC 3.87 (L) 4.00 - 5.40 M/uL    Hemoglobin 9.7 (L) 12.0 - 16.0 g/dL    Hematocrit 28.9 (L) 37.0 - 48.5 %    MCV 75 (L)  82 - 98 fL    MCH 25.1 (L) 27.0 - 31.0 pg    MCHC 33.6 32.0 - 36.0 g/dL    RDW 14.9 (H) 11.5 - 14.5 %    Platelets 267 150 - 450 K/uL    MPV 10.4 9.2 - 12.9 fL    Immature Granulocytes 1.3 (H) 0.0 - 0.5 %    Gran # (ANC) 10.7 (H) 1.8 - 7.7 K/uL    Immature Grans (Abs) 0.16 (H) 0.00 - 0.04 K/uL    Lymph # 1.1 1.0 - 4.8 K/uL    Mono # 0.3 0.3 - 1.0 K/uL    Eos # 0.0 0.0 - 0.5 K/uL    Baso # 0.04 0.00 - 0.20 K/uL    nRBC 0 0 /100 WBC    Gran % 87.0 (H) 38.0 - 73.0 %    Lymph % 8.6 (L) 18.0 - 48.0 %    Mono % 2.5 (L) 4.0 - 15.0 %    Eosinophil % 0.3 0.0 - 8.0 %    Basophil % 0.3 0.0 - 1.9 %    Differential Method Automated    Comprehensive metabolic panel   Result Value Ref Range    Sodium 135 (L) 136 - 145 mmol/L    Potassium 3.4 (L) 3.5 - 5.1 mmol/L    Chloride 103 95 - 110 mmol/L    CO2 22 (L) 23 - 29 mmol/L    Glucose 97 70 - 110 mg/dL    BUN 7 6 - 20 mg/dL    Creatinine 0.6 0.5 - 1.4 mg/dL    Calcium 8.7 8.7 - 10.5 mg/dL    Total Protein 6.9 6.0 - 8.4 g/dL    Albumin 2.8 (L) 3.5 - 5.2 g/dL    Total Bilirubin 0.4 0.1 - 1.0 mg/dL    Alkaline Phosphatase 66 55 - 135 U/L    AST 11 10 - 40 U/L    ALT 7 (L) 10 - 44 U/L    eGFR >60 >60 mL/min/1.73 m^2    Anion Gap 10 8 - 16 mmol/L   Lactic acid, plasma #1   Result Value Ref Range    Lactate (Lactic Acid) 1.4 0.5 - 2.2 mmol/L   Urinalysis, Reflex to Urine Culture Urine, Clean Catch    Specimen: Urine   Result Value Ref Range    Specimen UA Urine, Clean Catch     Color, UA Yellow Yellow, Straw, Irina    Appearance, UA Hazy (A) Clear    pH, UA 7.0 5.0 - 8.0    Specific Gravity, UA 1.010 1.005 - 1.030    Protein, UA 1+ (A) Negative    Glucose, UA Negative Negative    Ketones, UA Negative Negative    Bilirubin (UA) Negative Negative    Occult Blood UA 2+ (A) Negative    Nitrite, UA Positive (A) Negative    Urobilinogen, UA Negative <2.0 EU/dL    Leukocytes, UA 3+ (A) Negative   Urinalysis Microscopic   Result Value Ref Range    RBC, UA 94 (H) 0 - 4 /hpf    WBC, UA >100 (H) 0 -  5 /hpf    WBC Clumps, UA Many (A) None-Rare    Bacteria Many (A) None-Occ /hpf    Squam Epithel, UA >100 /hpf    Hyaline Casts, UA 0 0-1/lpf /lpf    Unclass Edyta UA Moderate None-Moderate    Microscopic Comment SEE COMMENT    Urinalysis Microscopic   Result Value Ref Range    RBC, UA 8 (H) 0 - 4 /hpf    WBC, UA >100 (H) 0 - 5 /hpf    WBC Clumps, UA Many (A) None-Rare    Bacteria Occasional None-Occ /hpf    Yeast, UA Moderate (A) None    Squam Epithel, UA 5 /hpf    Hyaline Casts, UA 0 0-1/lpf /lpf    Microscopic Comment SEE COMMENT          Imaging Results:  Imaging Results    None          The Emergency Provider reviewed the vital signs and test results, which are outlined above.     ED Discussion          Medical Decision Making  Amount and/or Complexity of Data Reviewed  Labs: ordered. Decision-making details documented in ED Course.  Radiology: ordered and independent interpretation performed. Decision-making details documented in ED Course.  Discussion of management or test interpretation with external provider(s):     5:52 AM: 32 yo at EGA 24 weeks presents c/o right flank pain, urinary urgency, chills, N/V x 2 days.  Denies vaginal bleed.  Positive FM.  ED w/u - tachycardic, R CVA tender, UA and lab results in chart. Consulted OB/GYN for recommendations/admission.     6:02 AM: Discussed case with Dr. Deluna (OB/GYN). Dr. Daigle agrees with current care and management of pt and accepts admission.   Admitting Service: OB/GYN  Admitting Physician: Dr. Daigle  Admit to: Obs    6:02 AM: Re-evaluated pt. I have discussed test results, shared treatment plan, and the need for admission with patient and family at bedside. Pt and family express understanding at this time and agree with all information. All questions answered. Pt and family have no further questions or concerns at this time. Pt is ready for admit.    Risk  Decision regarding hospitalization.    Critical Care  Total time providing critical care: 45  minutes                 ED Medication(s):  Medications   sodium chloride 0.9% flush 10 mL (has no administration in time range)   acetaminophen tablet 650 mg (650 mg Oral Given 3/8/24 0341)   ondansetron disintegrating tablet 8 mg (8 mg Oral Given 3/7/24 2130)   senna-docusate 8.6-50 mg per tablet 1 tablet (has no administration in time range)   simethicone chewable tablet 80 mg (has no administration in time range)   diphenhydrAMINE capsule 25 mg (has no administration in time range)   diphenhydrAMINE injection 25 mg (has no administration in time range)   prenatal vitamin oral tablet (1 tablet Oral Given 3/7/24 1233)   cefTRIAXone (Rocephin) 1 g in dextrose 5 % in water (D5W) 100 mL IVPB (MB+) (1 g Intravenous Not Given 3/7/24 1015)   HYDROcodone-acetaminophen 5-325 mg per tablet 1 tablet (has no administration in time range)   HYDROcodone-acetaminophen  mg per tablet 1 tablet (1 tablet Oral Given 3/8/24 0337)   0.9%  NaCl infusion ( Intravenous New Bag 3/8/24 0251)   sodium chloride 0.9% bolus 2,000 mL 2,000 mL (0 mLs Intravenous Stopped 3/7/24 0856)   promethazine (PHENERGAN) 12.5 mg in dextrose 5 % (D5W) 50 mL IVPB (0 mg Intravenous Stopped 3/7/24 0522)   cefTRIAXone (Rocephin) 1 g in dextrose 5 % in water (D5W) 100 mL IVPB (MB+) (0 g Intravenous Stopped 3/7/24 0620)   potassium chloride SA CR tablet 40 mEq (40 mEq Oral Given 3/7/24 1029)       Current Discharge Medication List                  Scribe Attestation:   Scribe #1: I performed the above scribed service and the documentation accurately describes the services I performed. I attest to the accuracy of the note.     Attending:   Physician Attestation Statement for Scribe #1: I, Maria Dolores Mascorro MD, personally performed the services described in this documentation, as scribed by Reagan Hector, in my presence, and it is both accurate and complete.          Clinical Impression       ICD-10-CM ICD-9-CM   1. Pyelonephritis affecting pregnancy  O23.00 646.60      590.80       Disposition:   Disposition: Placed in Observation  Condition: Maria Dolores Richardson MD  03/08/24 0509

## 2024-03-07 NOTE — HOSPITAL COURSE
Received first dose of ceftriaxone in ER with IVF bolus. Place in observation for second dose and continue IVF hydration. Reports pain better since receiving therapy in ER.  3/8/24-c/o N/V but is able to drink juice this AM. Still reports low right sided back pain, somewhat improved.   03/09/2024--continues with rt flank discomfort; e.coli, sens pending, tm 101 at 2000  3/10/24-afebrile for 24 hrs; ucx e.coli; stable for discharge

## 2024-03-07 NOTE — PROGRESS NOTES
Pt seen and examined on evening rounds.  Right flank pain improved after taking Norco.  Pt remains afebrile, mild tachycardia (likely related to infection, pain, and possible hypovolemia).    Continue IV rocephin.  Start IV fluids

## 2024-03-07 NOTE — SUBJECTIVE & OBJECTIVE
OB History    Para Term  AB Living   6 4 2 2 1 3   SAB IAB Ectopic Multiple Live Births   0 0 0 0 4      # Outcome Date GA Lbr Dejuan/2nd Weight Sex Delivery Anes PTL Lv   6 Current            5 AB 2018           4  18 34w0d  2.268 kg (5 lb) M CS-LTranv EPI, Gen Y VEGA      Complications: Failure to Progress in Second Stage   3 Term 16 40w0d  3.175 kg (7 lb) M Vag-Spont EPI  VEGA   2 Term 04/12/10 37w0d  2.722 kg (6 lb) F Vag-Spont EPI N VEGA   1  09 23w0d  0.454 kg (1 lb) F Vag-Spont   ND     No past medical history on file.  Past Surgical History:   Procedure Laterality Date     SECTION      CHOLECYSTECTOMY      DILATION AND CURETTAGE OF UTERUS USING SUCTION N/A 3/15/2019    Procedure: DILATION AND CURETTAGE, UTERUS, USING SUCTION;  Surgeon: Mayra Bryan MD;  Location: [facility] ER;  Service: OB/GYN;  Laterality: N/A;       (Not in a hospital admission)      Review of patient's allergies indicates:  No Known Allergies     Family History    None       Tobacco Use    Smoking status: Light Smoker     Current packs/day: 0.25     Types: Cigarettes    Smokeless tobacco: Not on file   Substance and Sexual Activity    Alcohol use: No    Drug use: No    Sexual activity: Yes     Review of Systems   Constitutional: Negative.    HENT: Negative.     Eyes: Negative.    Respiratory:  Negative for cough and shortness of breath.    Cardiovascular:  Negative for chest pain.   Gastrointestinal:  Negative for blood in stool, diarrhea, nausea and vomiting.   Endocrine: Negative.    Genitourinary:  Positive for flank pain.   Integumentary:  Negative.   Neurological:  Negative for syncope, numbness and headaches.   Psychiatric/Behavioral: Negative.        Objective:     Vital Signs (Most Recent):  Temp: 99.6 °F (37.6 °C) (24)  Pulse: (!) 115 (24)  Resp: 18 (24)  BP: (!) 112/59 (24)  SpO2: 99 % (24) Vital Signs (24h  Range):  Temp:  [99.6 °F (37.6 °C)] 99.6 °F (37.6 °C)  Pulse:  [115-127] 115  Resp:  [18] 18  SpO2:  [96 %-99 %] 99 %  BP: (103-132)/(59) 112/59        There is no height or weight on file to calculate BMI.       Physical Exam:   Constitutional: She is oriented to person, place, and time. She appears well-developed and well-nourished. No distress.    HENT:   Head: Normocephalic and atraumatic.       Pulmonary/Chest: Effort normal.        Abdominal: Soft.             Musculoskeletal: Moves all extremeties. No edema.      Comments: + right CVA tenderness       Neurological: She is alert and oriented to person, place, and time.    Skin: Skin is warm and dry.    Psychiatric: She has a normal mood and affect.        Cervix:  deferred       Significant Labs:  Lab Results   Component Value Date    GROUPTRH O POS 03/15/2019    HEPBSAG Non-reactive 01/04/2024       CBC:   Recent Labs   Lab 03/07/24  0457   WBC 12.31   RBC 3.87*   HGB 9.7*   HCT 28.9*      MCV 75*   MCH 25.1*   MCHC 33.6     CMP:   Recent Labs   Lab 03/07/24  0457   GLU 97   CALCIUM 8.7   ALBUMIN 2.8*   PROT 6.9   *   K 3.4*   CO2 22*      BUN 7   CREATININE 0.6   ALKPHOS 66   ALT 7*   AST 11   BILITOT 0.4     Recent Labs   Lab 03/07/24  0436   COLORU Yellow   SPECGRAV 1.020   PHUR 7.0   PROTEINUA 2+*   BACTERIA Many*   NITRITE Positive*   LEUKOCYTESUR 3+*   UROBILINOGEN Negative   HYALINECASTS 0     I have personallly reviewed all pertinent lab results from the last 24 hours.

## 2024-03-07 NOTE — H&P
[facility] - Emergency Dept.  Obstetrics  History & Physical    Patient Name: Gustavo Bledsoe  MRN: [id number]  Admission Date: 3/7/2024  Primary Care Provider: Swathi, Primary Doctor    Subjective:     Principal Problem:Pyelonephritis affecting pregnancy in second trimester    History of Present Illness:   Gustavo Bledsoe is a 33 y.o.  female with IUP at 23w6d weeks gestation ([facility])who presents to ER with complaint of working right flank pain. Reports UTI symptoms for the last 2 days that worsen prompting visit to ER. Denies fever, chills, nausea, vomiting. This IUP is complicated by hx of  delivery, history of previous C/D, history indicated cerclage placement this IUP.  Patient denies abdominal pain, vaginal bleeding or leakage of fluid. Reports active fetal movement. FHT verified per ER provider.            OB History    Para Term  AB Living   6 4 2 2 1 3   SAB IAB Ectopic Multiple Live Births   0 0 0 0 4      # Outcome Date GA Lbr Dejuan/2nd Weight Sex Delivery Anes PTL Lv   6 Current            5 AB 2018           4  18 34w0d  2.268 kg (5 lb) M CS-LTranv EPI, Gen Y VEGA      Complications: Failure to Progress in Second Stage   3 Term 16 40w0d  3.175 kg (7 lb) M Vag-Spont EPI  VEGA   2 Term 04/12/10 37w0d  2.722 kg (6 lb) F Vag-Spont EPI N VEGA   1  09 23w0d  0.454 kg (1 lb) F Vag-Spont   ND     No past medical history on file.  Past Surgical History:   Procedure Laterality Date     SECTION      CHOLECYSTECTOMY      DILATION AND CURETTAGE OF UTERUS USING SUCTION N/A 3/15/2019    Procedure: DILATION AND CURETTAGE, UTERUS, USING SUCTION;  Surgeon: Mayra Bryan MD;  Location: [facility] OR;  Service: OB/GYN;  Laterality: N/A;       (Not in a hospital admission)      Review of patient's allergies indicates:  No Known Allergies     Family History    None       Tobacco Use    Smoking status: Light Smoker     Current packs/day: 0.25      Types: Cigarettes    Smokeless tobacco: Not on file   Substance and Sexual Activity    Alcohol use: No    Drug use: No    Sexual activity: Yes     Review of Systems   Constitutional: Negative.    HENT: Negative.     Eyes: Negative.    Respiratory:  Negative for cough and shortness of breath.    Cardiovascular:  Negative for chest pain.   Gastrointestinal:  Negative for blood in stool, diarrhea, nausea and vomiting.   Endocrine: Negative.    Genitourinary:  Positive for flank pain.   Integumentary:  Negative.   Neurological:  Negative for syncope, numbness and headaches.   Psychiatric/Behavioral: Negative.        Objective:     Vital Signs (Most Recent):  Temp: 99.6 °F (37.6 °C) (03/07/24 0406)  Pulse: (!) 115 (03/07/24 0601)  Resp: 18 (03/07/24 0406)  BP: (!) 112/59 (03/07/24 0601)  SpO2: 99 % (03/07/24 0601) Vital Signs (24h Range):  Temp:  [99.6 °F (37.6 °C)] 99.6 °F (37.6 °C)  Pulse:  [115-127] 115  Resp:  [18] 18  SpO2:  [96 %-99 %] 99 %  BP: (103-132)/(59) 112/59        There is no height or weight on file to calculate BMI.       Physical Exam:   Constitutional: She is oriented to person, place, and time. She appears well-developed and well-nourished. No distress.    HENT:   Head: Normocephalic and atraumatic.       Pulmonary/Chest: Effort normal.        Abdominal: Soft.             Musculoskeletal: Moves all extremeties. No edema.      Comments: + right CVA tenderness       Neurological: She is alert and oriented to person, place, and time.    Skin: Skin is warm and dry.    Psychiatric: She has a normal mood and affect.        Cervix:  deferred       Significant Labs:  Lab Results   Component Value Date    GROUPTRH O POS 03/15/2019    HEPBSAG Non-reactive 01/04/2024       CBC:   Recent Labs   Lab 03/07/24 0457   WBC 12.31   RBC 3.87*   HGB 9.7*   HCT 28.9*      MCV 75*   MCH 25.1*   MCHC 33.6     CMP:   Recent Labs   Lab 03/07/24 0457   GLU 97   CALCIUM 8.7   ALBUMIN 2.8*   PROT 6.9   *   K 3.4*    CO2 22*      BUN 7   CREATININE 0.6   ALKPHOS 66   ALT 7*   AST 11   BILITOT 0.4     Recent Labs   Lab 24  0436   COLORU Yellow   SPECGRAV 1.020   PHUR 7.0   PROTEINUA 2+*   BACTERIA Many*   NITRITE Positive*   LEUKOCYTESUR 3+*   UROBILINOGEN Negative   HYALINECASTS 0     I have personallly reviewed all pertinent lab results from the last 24 hours.  Assessment/Plan:     33 y.o. female  at 23w6d for:    * Pyelonephritis affecting pregnancy in second trimester  - continue ceftriaxone and IVF hydration    History of  delivery, cerclage in place  - currently stable    Iron deficiency anemia during pregnancy  - iron while inpatient        Marybeth Deluna MD  Obstetrics  [facility] - Emergency Dept.

## 2024-03-08 ENCOUNTER — CLINICAL SUPPORT (OUTPATIENT)
Dept: SMOKING CESSATION | Facility: CLINIC | Age: 34
End: 2024-03-08
Payer: COMMERCIAL

## 2024-03-08 DIAGNOSIS — F17.200 NICOTINE DEPENDENCE: Primary | ICD-10-CM

## 2024-03-08 PROCEDURE — 99406 BEHAV CHNG SMOKING 3-10 MIN: CPT | Mod: S$GLB,,,

## 2024-03-08 PROCEDURE — 25000003 PHARM REV CODE 250: Performed by: OBSTETRICS & GYNECOLOGY

## 2024-03-08 PROCEDURE — 21400001 HC TELEMETRY ROOM

## 2024-03-08 PROCEDURE — 99232 SBSQ HOSP IP/OBS MODERATE 35: CPT | Mod: ,,, | Performed by: OBSTETRICS & GYNECOLOGY

## 2024-03-08 PROCEDURE — 11000001 HC ACUTE MED/SURG PRIVATE ROOM

## 2024-03-08 PROCEDURE — 63600175 PHARM REV CODE 636 W HCPCS: Performed by: OBSTETRICS & GYNECOLOGY

## 2024-03-08 RX ORDER — ZOLPIDEM TARTRATE 5 MG/1
5 TABLET ORAL NIGHTLY PRN
Status: DISCONTINUED | OUTPATIENT
Start: 2024-03-08 | End: 2024-03-10 | Stop reason: HOSPADM

## 2024-03-08 RX ADMIN — HYDROCODONE BITARTRATE AND ACETAMINOPHEN 1 TABLET: 5; 325 TABLET ORAL at 09:03

## 2024-03-08 RX ADMIN — HYDROCODONE BITARTRATE AND ACETAMINOPHEN 1 TABLET: 10; 325 TABLET ORAL at 04:03

## 2024-03-08 RX ADMIN — ONDANSETRON 8 MG: 8 TABLET, ORALLY DISINTEGRATING ORAL at 09:03

## 2024-03-08 RX ADMIN — SODIUM CHLORIDE: 9 INJECTION, SOLUTION INTRAVENOUS at 02:03

## 2024-03-08 RX ADMIN — HYDROCODONE BITARTRATE AND ACETAMINOPHEN 1 TABLET: 10; 325 TABLET ORAL at 11:03

## 2024-03-08 RX ADMIN — ACETAMINOPHEN 650 MG: 325 TABLET ORAL at 03:03

## 2024-03-08 RX ADMIN — ACETAMINOPHEN 650 MG: 325 TABLET ORAL at 08:03

## 2024-03-08 RX ADMIN — SODIUM CHLORIDE: 9 INJECTION, SOLUTION INTRAVENOUS at 01:03

## 2024-03-08 RX ADMIN — CEFTRIAXONE 1 G: 1 INJECTION, POWDER, FOR SOLUTION INTRAMUSCULAR; INTRAVENOUS at 09:03

## 2024-03-08 RX ADMIN — ZOLPIDEM TARTRATE 5 MG: 5 TABLET ORAL at 08:03

## 2024-03-08 RX ADMIN — SODIUM CHLORIDE: 9 INJECTION, SOLUTION INTRAVENOUS at 08:03

## 2024-03-08 RX ADMIN — ACETAMINOPHEN 650 MG: 325 TABLET ORAL at 01:03

## 2024-03-08 RX ADMIN — HYDROCODONE BITARTRATE AND ACETAMINOPHEN 1 TABLET: 10; 325 TABLET ORAL at 03:03

## 2024-03-08 NOTE — PATIENT INSTRUCTIONS
Patient refuses nicotine patch and smoking cessation appointment at this time.  Patient states she only smokes marijuana.

## 2024-03-08 NOTE — PLAN OF CARE
attended the interdisciplinary rounds for Leesa Blake, who is a 78 y.o.,male. Patients Primary Language is: [place]. According to the patients EMR Scientology Affiliation is: No preference. The reason the Patient came to the hospital is:   Patient Active Problem List    Diagnosis Date Noted    DVT (deep venous thrombosis) ([facility] 04/10/2021    Respiratory failure ([facility] 04/05/2021    Obesity (BMI 30-39.9) 04/05/2021    Diabetic neuropathy associated with type 2 diabetes mellitus ([facility] 04/05/2021    Diabetic retinopathy associated with type 2 diabetes mellitus ([facility] 04/05/2021    Pulmonary infiltrates 04/05/2021    Controlled type 2 diabetes mellitus with neurologic complication, without long-term current use of insulin ([facility] 04/05/2021    Angioedema due to angiotensin converting enzyme inhibitor (ACE-I) 04/04/2021    JACQUELYN (acute kidney injury) ([facility] 04/04/2021    Cardiac arrest ([facility] 04/04/2021      Plan:  Chaplains will continue to follow and will provide pastoral care on an as needed/requested basis.  recommends bedside caregivers page  on duty if patient shows signs of acute spiritual or emotional distress.     [address] [facility]  Board Certified [address] [facility]   [phone] AAOx4. Pain is being managed. Pt remained free from fall and injury. No sign of any distress noted. Tele monitoring on going. Safety precautions alert. Pt asked to call for assistance if needed. IV access clean dry and intact. Chart checked reviewed. VSS. Plan of care continued.

## 2024-03-08 NOTE — SUBJECTIVE & OBJECTIVE
Obstetric HPI:  Patient reports None contractions, active fetal movement, absent vaginal bleeding , absent loss of fluid      Objective:     Vital Signs (Most Recent):  Temp: 98.1 °F (36.7 °C) (03/08/24 0735)  Pulse: 105 (03/08/24 0820)  Resp: 18 (03/08/24 0944)  BP: (!) 109/58 (03/08/24 0735)  SpO2: 99 % (03/08/24 0735) Vital Signs (24h Range):  Temp:  [97.6 °F (36.4 °C)-101.7 °F (38.7 °C)] 98.1 °F (36.7 °C)  Pulse:  [] 105  Resp:  [16-20] 18  SpO2:  [95 %-100 %] 99 %  BP: ()/(53-75) 109/58     FHTs + in ER    Weight: 68.4 kg (150 lb 12.7 oz)  Body mass index is 24.34 kg/m².    Intake/Output Summary (Last 24 hours) at 3/8/2024 1005  Last data filed at 3/8/2024 0955  Gross per 24 hour   Intake 1620.25 ml   Output --   Net 1620.25 ml       Significant Labs:  Recent Lab Results       None            Physical Exam:   Constitutional: She is oriented to person, place, and time. She appears well-developed and well-nourished.        Pulmonary/Chest: Effort normal.        Abdominal: She exhibits abdominal incision. She exhibits no distension.     Genitourinary:    Genitourinary Comments: Right sided low back tenderness             Musculoskeletal: Moves all extremeties.       Neurological: She is alert and oriented to person, place, and time.    Skin: Skin is warm and dry.    Psychiatric: She has a normal mood and affect. Her behavior is normal.       Review of Systems

## 2024-03-08 NOTE — PLAN OF CARE
Patient is stable. No signs or symptoms of acute distress. IVFs and antibiotics given. Pain control with pain meds. Patient remained afebrile. Continuous cardiac monitoring in place. Bed in lowest position, call light in reach. Chart orders reviewed.

## 2024-03-08 NOTE — PROGRESS NOTES
[facility] Surg  Obstetrics  Antepartum Progress Note    Patient Name: Gustavo Bledsoe  MRN: [id number]  Admission Date: 3/7/2024  Hospital Length of Stay: 1 days  Attending Physician: Lizbeth Daigle MD  Primary Care Provider: Swathi, Primary Doctor    Subjective:     Principal Problem:Pyelonephritis affecting pregnancy in second trimester    HPI:   Gustavo Bledsoe is a 33 y.o.  female with IUP at 23w6d weeks gestation ([facility])who presents to ER with complaint of working right flank pain. Reports UTI symptoms for the last 2 days that worsen prompting visit to ER. Denies fever, chills, nausea, vomiting. This IUP is complicated by hx of  delivery, history of previous C/D, history indicated cerclage placement this IUP.  Patient denies abdominal pain, vaginal bleeding or leakage of fluid. Reports active fetal movement. FHT verified per ER provider.        Hospital Course:  Received first dose of ceftriaxone in ER with IVF bolus. Place in observation for second dose and continue IVF hydration. Reports pain better since receiving therapy in ER.  3/8/24-c/o N/V but is able to drink juice this AM. Still reports low right sided back pain, somewhat improved.     Obstetric HPI:  Patient reports None contractions, active fetal movement, absent vaginal bleeding , absent loss of fluid      Objective:     Vital Signs (Most Recent):  Temp: 98.1 °F (36.7 °C) (24 0735)  Pulse: 105 (24 0820)  Resp: 18 (24 0944)  BP: (!) 109/58 (24 0735)  SpO2: 99 % (24 0735) Vital Signs (24h Range):  Temp:  [97.6 °F (36.4 °C)-101.7 °F (38.7 °C)] 98.1 °F (36.7 °C)  Pulse:  [] 105  Resp:  [16-20] 18  SpO2:  [95 %-100 %] 99 %  BP: ()/(53-75) 109/58     FHTs + in ER    Weight: 68.4 kg (150 lb 12.7 oz)  Body mass index is 24.34 kg/m².    Intake/Output Summary (Last 24 hours) at 3/8/2024 1005  Last data filed at 3/8/2024 0955  Gross per 24 hour   Intake 1620.25 ml   Output --   Net  1620.25 ml       Significant Labs:  Recent Lab Results       None            Physical Exam:   Constitutional: She is oriented to person, place, and time. She appears well-developed and well-nourished.        Pulmonary/Chest: Effort normal.        Abdominal: She exhibits abdominal incision. She exhibits no distension.     Genitourinary:    Genitourinary Comments: Right sided low back tenderness             Musculoskeletal: Moves all extremeties.       Neurological: She is alert and oriented to person, place, and time.    Skin: Skin is warm and dry.    Psychiatric: She has a normal mood and affect. Her behavior is normal.       Review of Systems  Assessment/Plan:     33 y.o. female  at 24w0d for:    * Pyelonephritis affecting pregnancy in second trimester  continue ceftriaxone and IVF hydration. Plan of management discussed with pt  Cultures pending  Tm-101.7 @ 3/8/24 4am    History of  delivery, cerclage in place  - currently stable    Iron deficiency anemia during pregnancy  - iron while inpatient      Gavi Garzon MD  Obstetrics  [facility] - Med Surg

## 2024-03-08 NOTE — PLAN OF CARE
[facility]  Initial Discharge Assessment       Primary Care Provider: Swathi, Primary Doctor    Admission Diagnosis: Pyelonephritis affecting pregnancy [O23.00]    Admission Date: 3/7/2024  Expected Discharge Date: Per Attending     Transition of Care Barriers: None    Payor: MEDICAID / Plan: [employer] / Product Type: Managed Medicaid /     No emergency contact information on file.    Discharge Plan A: Home         [facility]  [address] [place] [address]  Phone: [phone] Fax: [phone]    [facility] [employer] [id number]  [place] - [address] [facility] [address]  [place] [address]  Phone: [phone] Fax: [phone]      Initial Assessment (most recent)       Adult Discharge Assessment - 03/08/24 1109          Discharge Assessment    Assessment Type Discharge Planning Assessment     Confirmed/corrected address, phone number and insurance Yes     Confirmed Demographics Correct on Facesheet     Source of Information health record     Communicated LUCÍA with patient/caregiver Date not available/Unable to determine     Reason For Admission pyelonephritis affecting pregnancy in second trimester     Do you expect to return to your current living situation? Yes     Walking or Climbing Stairs Difficulty no     Dressing/Bathing Difficulty no     Home Layout Able to live on 1st floor     Equipment Currently Used at Home none     Readmission within 30 days? No     Patient currently being followed by outpatient case management? No     Do you currently have service(s) that help you manage your care at home? No     How do you get to doctors appointments? car, drives self     Are you on dialysis? No     Do you take coumadin? No     Discharge Plan A Home     DME Needed Upon Discharge  none     Transition of Care Barriers None                   Chart review completed. Pt has no d/c needs at this time. Sw to follow up, as needed,  for d/c planning purposes.

## 2024-03-08 NOTE — ASSESSMENT & PLAN NOTE
continue ceftriaxone and IVF hydration. Plan of management discussed with pt  Cultures pending  Tm-101.7 @ 3/8/24 4am

## 2024-03-09 PROBLEM — D62 ACUTE BLOOD LOSS ANEMIA: Status: RESOLVED | Noted: 2019-03-15 | Resolved: 2024-03-09

## 2024-03-09 LAB
BACTERIA UR CULT: ABNORMAL
BACTERIA UR CULT: ABNORMAL

## 2024-03-09 PROCEDURE — 25000003 PHARM REV CODE 250: Performed by: OBSTETRICS & GYNECOLOGY

## 2024-03-09 PROCEDURE — 63600175 PHARM REV CODE 636 W HCPCS: Performed by: OBSTETRICS & GYNECOLOGY

## 2024-03-09 PROCEDURE — 99231 SBSQ HOSP IP/OBS SF/LOW 25: CPT | Mod: 95,,, | Performed by: OBSTETRICS & GYNECOLOGY

## 2024-03-09 PROCEDURE — 11000001 HC ACUTE MED/SURG PRIVATE ROOM

## 2024-03-09 PROCEDURE — 21400001 HC TELEMETRY ROOM

## 2024-03-09 RX ORDER — AMOXICILLIN 250 MG
1 CAPSULE ORAL NIGHTLY
Status: DISCONTINUED | OUTPATIENT
Start: 2024-03-09 | End: 2024-03-10 | Stop reason: HOSPADM

## 2024-03-09 RX ORDER — OXYCODONE AND ACETAMINOPHEN 5; 325 MG/1; MG/1
1 TABLET ORAL EVERY 4 HOURS PRN
Status: DISCONTINUED | OUTPATIENT
Start: 2024-03-09 | End: 2024-03-10 | Stop reason: HOSPADM

## 2024-03-09 RX ORDER — SODIUM CHLORIDE AND POTASSIUM CHLORIDE 150; 900 MG/100ML; MG/100ML
INJECTION, SOLUTION INTRAVENOUS CONTINUOUS
Status: DISCONTINUED | OUTPATIENT
Start: 2024-03-09 | End: 2024-03-10 | Stop reason: HOSPADM

## 2024-03-09 RX ORDER — OXYCODONE AND ACETAMINOPHEN 7.5; 325 MG/1; MG/1
1 TABLET ORAL EVERY 6 HOURS PRN
Status: DISCONTINUED | OUTPATIENT
Start: 2024-03-09 | End: 2024-03-10 | Stop reason: HOSPADM

## 2024-03-09 RX ADMIN — ZOLPIDEM TARTRATE 5 MG: 5 TABLET ORAL at 08:03

## 2024-03-09 RX ADMIN — OXYCODONE HYDROCHLORIDE AND ACETAMINOPHEN 1 TABLET: 5; 325 TABLET ORAL at 02:03

## 2024-03-09 RX ADMIN — OXYCODONE HYDROCHLORIDE AND ACETAMINOPHEN 1 TABLET: 5; 325 TABLET ORAL at 06:03

## 2024-03-09 RX ADMIN — CEFTRIAXONE 1 G: 1 INJECTION, POWDER, FOR SOLUTION INTRAMUSCULAR; INTRAVENOUS at 10:03

## 2024-03-09 RX ADMIN — SODIUM CHLORIDE AND POTASSIUM CHLORIDE: 9; 1.49 INJECTION, SOLUTION INTRAVENOUS at 10:03

## 2024-03-09 RX ADMIN — PRENATAL VITAMINS-IRON FUMARATE 27 MG IRON-FOLIC ACID 0.8 MG TABLET 1 TABLET: at 10:03

## 2024-03-09 RX ADMIN — HYDROCODONE BITARTRATE AND ACETAMINOPHEN 1 TABLET: 10; 325 TABLET ORAL at 06:03

## 2024-03-09 RX ADMIN — SODIUM CHLORIDE AND POTASSIUM CHLORIDE: 9; 1.49 INJECTION, SOLUTION INTRAVENOUS at 06:03

## 2024-03-09 RX ADMIN — OXYCODONE HYDROCHLORIDE AND ACETAMINOPHEN 1 TABLET: 5; 325 TABLET ORAL at 10:03

## 2024-03-09 NOTE — PROGRESS NOTES
[facility] Surg  Obstetrics  Antepartum Progress Note    Patient Name: Gustavo Bledsoe  MRN: [id number]  Admission Date: 3/7/2024  Hospital Length of Stay: 2 days  Attending Physician: Lizbeth Daigle MD  Primary Care Provider: Swathi, Primary Doctor    Subjective:     Principal Problem:Pyelonephritis affecting pregnancy in second trimester    HPI:   Gustavo Bledsoe is a 33 y.o.  female with IUP at 23w6d weeks gestation ([facility])who presents to ER with complaint of working right flank pain. Reports UTI symptoms for the last 2 days that worsen prompting visit to ER. Denies fever, chills, nausea, vomiting. This IUP is complicated by hx of  delivery, history of previous C/D, history indicated cerclage placement this IUP.  Patient denies abdominal pain, vaginal bleeding or leakage of fluid. Reports active fetal movement. FHT verified per ER provider.          Hospital Course:  Received first dose of ceftriaxone in ER with IVF bolus. Place in observation for second dose and continue IVF hydration. Reports pain better since receiving therapy in ER.  3/8/24-c/o N/V but is able to drink juice this AM. Still reports low right sided back pain, somewhat improved.   2024--continues with rt flank discomfort; e.coli, sens pending, tm 101 at 2000      Obstetric HPI:  Patient reports None contractions, active fetal movement, absent vaginal bleeding , absent loss of fluid      Objective:     Vital Signs (Most Recent):  Temp: 99 °F (37.2 °C) (24)  Pulse: 108 (24 0730)  Resp: 16 (24)  BP: (!) 105/58 (24)  SpO2: 100 % (24) Vital Signs (24h Range):  Temp:  [98.3 °F (36.8 °C)-101 °F (38.3 °C)] 99 °F (37.2 °C)  Pulse:  [100-128] 108  Resp:  [16-20] 16  SpO2:  [97 %-100 %] 100 %  BP: (101-125)/(50-74) 105/58     Weight: 68.4 kg (150 lb 12.7 oz)  Body mass index is 24.34 kg/m².    FHT: + doppler    Intake/Output Summary (Last 24 hours) at 3/9/2024 4658  Last  data filed at 3/9/2024 0609  Gross per 24 hour   Intake 3257.03 ml   Output --   Net 3257.03 ml       Cervix deferred    Significant Labs:  Recent Lab Results       None            Physical Exam:   Constitutional: She is oriented to person, place, and time. She appears well-developed and well-nourished.    HENT:   Head: Normocephalic.    Eyes: Pupils are equal, round, and reactive to light. Conjunctivae are normal.     Cardiovascular:  Normal rate and regular rhythm.             Pulmonary/Chest: Effort normal and breath sounds normal.        Abdominal: Soft.     Genitourinary:    Genitourinary Comments: Gravid, non tender             Musculoskeletal: Normal range of motion and moves all extremeties.      Comments: +rt flank pain       Neurological: She is alert and oriented to person, place, and time. She has normal reflexes.    Skin: Skin is warm and dry.    Psychiatric: She has a normal mood and affect. Her behavior is normal. Judgment and thought content normal.       Review of Systems   Musculoskeletal:  Positive for back pain.   All other systems reviewed and are negative.    Assessment/Plan:     33 y.o. female  at 24w1d for:    * Pyelonephritis affecting pregnancy in second trimester  continue ceftriaxone and IVF hydration. Plan of management discussed with pt  Cultures pending  Tm-101.7 @ 3/8/24 4am  2024  HD #2  Continue ceftriaxone and iv hydration  Cotninue pain management  Stable for discharge on oral abx once afebrile for 24-48 hrs and e. Coli sensitivities resulted      Hypokalemia  2024  Continue iv fluids with potassium  Bmp in am    History of  delivery, cerclage in place  - currently stable    Iron deficiency anemia during pregnancy  - iron while inpatient  2024  Continue iron          Abril Suresh MD  Obstetrics  [facility] - Med Surg

## 2024-03-09 NOTE — PLAN OF CARE
Discussed poc with pt, pt verbalized understanding    Purposeful rounding every 2hours    VS wnl  Cardiac monitoring in use, pt is [employer], tele monitor # [id number]    Fall precautions in place, remains injury free  Pt denies c/o nausea  Pain under control with PRN meds    IVFs  Accurate I&Os  Abx given as prescribed  Bed locked at lowest position  Call light within reach    Chart check complete  Will cont with POC    FHT done per order  Repeat labs in am  BC NGTD   UA cx sensitivities pending

## 2024-03-09 NOTE — ASSESSMENT & PLAN NOTE
continue ceftriaxone and IVF hydration. Plan of management discussed with pt  Cultures pending  Tm-101.7 @ 3/8/24 4am  03/09/2024  HD #2  Continue ceftriaxone and iv hydration  Cotninue pain management  Stable for discharge on oral abx once afebrile for 24-48 hrs and e. Coli sensitivities resulted

## 2024-03-09 NOTE — SUBJECTIVE & OBJECTIVE
Obstetric HPI:  Patient reports None contractions, active fetal movement, absent vaginal bleeding , absent loss of fluid      Objective:     Vital Signs (Most Recent):  Temp: 99 °F (37.2 °C) (03/09/24 0718)  Pulse: 108 (03/09/24 0730)  Resp: 16 (03/09/24 0718)  BP: (!) 105/58 (03/09/24 0718)  SpO2: 100 % (03/09/24 0718) Vital Signs (24h Range):  Temp:  [98.3 °F (36.8 °C)-101 °F (38.3 °C)] 99 °F (37.2 °C)  Pulse:  [100-128] 108  Resp:  [16-20] 16  SpO2:  [97 %-100 %] 100 %  BP: (101-125)/(50-74) 105/58     Weight: 68.4 kg (150 lb 12.7 oz)  Body mass index is 24.34 kg/m².    FHT: + doppler    Intake/Output Summary (Last 24 hours) at 3/9/2024 0937  Last data filed at 3/9/2024 0609  Gross per 24 hour   Intake 3257.03 ml   Output --   Net 3257.03 ml       Cervix deferred    Significant Labs:  Recent Lab Results       None            Physical Exam:   Constitutional: She is oriented to person, place, and time. She appears well-developed and well-nourished.    HENT:   Head: Normocephalic.    Eyes: Pupils are equal, round, and reactive to light. Conjunctivae are normal.     Cardiovascular:  Normal rate and regular rhythm.             Pulmonary/Chest: Effort normal and breath sounds normal.        Abdominal: Soft.     Genitourinary:    Genitourinary Comments: Gravid, non tender             Musculoskeletal: Normal range of motion and moves all extremeties.      Comments: +rt flank pain       Neurological: She is alert and oriented to person, place, and time. She has normal reflexes.    Skin: Skin is warm and dry.    Psychiatric: She has a normal mood and affect. Her behavior is normal. Judgment and thought content normal.       Review of Systems   Musculoskeletal:  Positive for back pain.   All other systems reviewed and are negative.

## 2024-03-09 NOTE — PLAN OF CARE
Call button within reach. PRN pain medication given per MD order. Max temp 101. Blankets removed and medication given for fever.     Problem: Adult Inpatient Plan of Care  Goal: Plan of Care Review  Outcome: Ongoing, Progressing  Goal: Patient-Specific Goal (Individualized)  Outcome: Ongoing, Progressing  Goal: Absence of Hospital-Acquired Illness or Injury  Outcome: Ongoing, Progressing  Goal: Optimal Comfort and Wellbeing  Outcome: Ongoing, Progressing  Goal: Readiness for Transition of Care  Outcome: Ongoing, Progressing     Problem:  Fall Injury Risk  Goal: Absence of Fall, Infant Drop and Related Injury  Outcome: Ongoing, Progressing

## 2024-03-10 VITALS
OXYGEN SATURATION: 99 % | HEIGHT: 66 IN | BODY MASS INDEX: 25.33 KG/M2 | WEIGHT: 157.63 LBS | HEART RATE: 92 BPM | SYSTOLIC BLOOD PRESSURE: 95 MMHG | RESPIRATION RATE: 16 BRPM | TEMPERATURE: 99 F | DIASTOLIC BLOOD PRESSURE: 53 MMHG

## 2024-03-10 PROBLEM — E87.6 HYPOKALEMIA: Status: RESOLVED | Noted: 2024-03-07 | Resolved: 2024-03-10

## 2024-03-10 LAB
ANION GAP SERPL CALC-SCNC: 8 MMOL/L (ref 8–16)
BUN SERPL-MCNC: 3 MG/DL (ref 6–20)
CALCIUM SERPL-MCNC: 8.3 MG/DL (ref 8.7–10.5)
CHLORIDE SERPL-SCNC: 105 MMOL/L (ref 95–110)
CO2 SERPL-SCNC: 22 MMOL/L (ref 23–29)
CREAT SERPL-MCNC: 0.6 MG/DL (ref 0.5–1.4)
ERYTHROCYTE [DISTWIDTH] IN BLOOD BY AUTOMATED COUNT: 15.5 % (ref 11.5–14.5)
EST. GFR  (NO RACE VARIABLE): >60 ML/MIN/1.73 M^2
GLUCOSE SERPL-MCNC: 82 MG/DL (ref 70–110)
HCT VFR BLD AUTO: 28.1 % (ref 37–48.5)
HGB BLD-MCNC: 9.1 G/DL (ref 12–16)
MCH RBC QN AUTO: 24.8 PG (ref 27–31)
MCHC RBC AUTO-ENTMCNC: 32.4 G/DL (ref 32–36)
MCV RBC AUTO: 77 FL (ref 82–98)
PLATELET # BLD AUTO: 278 K/UL (ref 150–450)
PMV BLD AUTO: 9.6 FL (ref 9.2–12.9)
POTASSIUM SERPL-SCNC: 3.7 MMOL/L (ref 3.5–5.1)
RBC # BLD AUTO: 3.67 M/UL (ref 4–5.4)
SODIUM SERPL-SCNC: 135 MMOL/L (ref 136–145)
WBC # BLD AUTO: 10.61 K/UL (ref 3.9–12.7)

## 2024-03-10 PROCEDURE — 63600175 PHARM REV CODE 636 W HCPCS: Performed by: OBSTETRICS & GYNECOLOGY

## 2024-03-10 PROCEDURE — 99238 HOSP IP/OBS DSCHRG MGMT 30/<: CPT | Mod: ,,, | Performed by: OBSTETRICS & GYNECOLOGY

## 2024-03-10 PROCEDURE — 25000003 PHARM REV CODE 250: Performed by: OBSTETRICS & GYNECOLOGY

## 2024-03-10 PROCEDURE — 80048 BASIC METABOLIC PNL TOTAL CA: CPT | Performed by: OBSTETRICS & GYNECOLOGY

## 2024-03-10 PROCEDURE — 85027 COMPLETE CBC AUTOMATED: CPT | Performed by: OBSTETRICS & GYNECOLOGY

## 2024-03-10 PROCEDURE — 36415 COLL VENOUS BLD VENIPUNCTURE: CPT | Performed by: OBSTETRICS & GYNECOLOGY

## 2024-03-10 RX ORDER — CEPHALEXIN 500 MG/1
500 CAPSULE ORAL EVERY 6 HOURS
Qty: 28 CAPSULE | Refills: 0 | Status: SHIPPED | OUTPATIENT
Start: 2024-03-10 | End: 2024-03-17

## 2024-03-10 RX ORDER — FERROUS SULFATE 325(65) MG
325 TABLET ORAL
Qty: 90 TABLET | Refills: 1 | Status: SHIPPED | OUTPATIENT
Start: 2024-03-10 | End: 2024-09-06

## 2024-03-10 RX ORDER — HYDROCODONE BITARTRATE AND ACETAMINOPHEN 5; 325 MG/1; MG/1
1 TABLET ORAL EVERY 6 HOURS PRN
Status: DISCONTINUED | OUTPATIENT
Start: 2024-03-10 | End: 2024-03-10 | Stop reason: HOSPADM

## 2024-03-10 RX ORDER — AMOXICILLIN 250 MG
1 CAPSULE ORAL NIGHTLY
Qty: 10 TABLET | Refills: 0 | Status: SHIPPED | OUTPATIENT
Start: 2024-03-10 | End: 2024-03-20

## 2024-03-10 RX ADMIN — PRENATAL VITAMINS-IRON FUMARATE 27 MG IRON-FOLIC ACID 0.8 MG TABLET 1 TABLET: at 08:03

## 2024-03-10 RX ADMIN — OXYCODONE HYDROCHLORIDE AND ACETAMINOPHEN 1 TABLET: 5; 325 TABLET ORAL at 12:03

## 2024-03-10 RX ADMIN — SODIUM CHLORIDE AND POTASSIUM CHLORIDE: 9; 1.49 INJECTION, SOLUTION INTRAVENOUS at 01:03

## 2024-03-10 RX ADMIN — OXYCODONE AND ACETAMINOPHEN 1 TABLET: 7.5; 325 TABLET ORAL at 08:03

## 2024-03-10 NOTE — PLAN OF CARE
AVS reviewed w/ patient; verbalized understanding.   Questions encouraged/answered.   Informed of upcoming appts, new meds & where to p/u.   Educated on when to contact MD/come to ER; issued add'l info.   PIV removed w/ catheter intact.   Tele-monitor removed & returned to monitor room.   Transportation arrangements made;    Will continue to monitor until off unit.

## 2024-03-10 NOTE — DISCHARGE SUMMARY
[facility]  Obstetrics  Discharge Summary      Patient Name: Gustavo Bledsoe  MRN: [id number]  Admission Date: 3/7/2024  Hospital Length of Stay: 3 days  Discharge Date and Time:  03/10/2024 9:48 AM  Attending Physician: Lizbeth Daigle MD   Discharging Provider: Abril Suresh MD   Primary Care Provider: Swathi, Primary Doctor    HPI:  Gustavo Bledsoe is a 33 y.o.  female with IUP at 23w6d weeks gestation ([facility])who presents to ER with complaint of working right flank pain. Reports UTI symptoms for the last 2 days that worsen prompting visit to ER. Denies fever, chills, nausea, vomiting. This IUP is complicated by hx of  delivery, history of previous C/D, history indicated cerclage placement this IUP.  Patient denies abdominal pain, vaginal bleeding or leakage of fluid. Reports active fetal movement. FHT verified per ER provider.          FHT: + on doppler  * No surgery found *     Hospital Course:   Received first dose of ceftriaxone in ER with IVF bolus. Place in observation for second dose and continue IVF hydration. Reports pain better since receiving therapy in ER.  3/8/24-c/o N/V but is able to drink juice this AM. Still reports low right sided back pain, somewhat improved.   2024--continues with rt flank discomfort; e.coli, sens pending, tm 101 at 2000  3/10/24-afebrile for 24 hrs; ucx e.coli; stable for discharge     Consults (From admission, onward)          Status Ordering Provider     Inpatient consult to Obstetrics / Gynecology  Once        Provider:  Marybeth Deluna MD    Acknowledged DIPIKA MALLOY            Final Active Diagnoses:    Diagnosis Date Noted POA    PRINCIPAL PROBLEM:  Pyelonephritis affecting pregnancy in second trimester [O23.02] 2024 Yes    Iron deficiency anemia during pregnancy [O99.019, D50.9] 2024 Yes    History of  delivery affecting pregnancy [O34.219] 2024 Yes    History of  delivery, cerclage in place  [O09.899] 03/07/2024 Not Applicable      Problems Resolved During this Admission:    Diagnosis Date Noted Date Resolved POA    Hypokalemia [E87.6] 03/07/2024 03/10/2024 Yes        Significant Diagnostic Studies: Labs: All labs within the past 24 hours have been reviewed  Microbiology: Blood Culture   Lab Results   Component Value Date    LABBLOO No Growth to date 03/07/2024    LABBLOO No Growth to date 03/07/2024    LABBLOO No Growth to date 03/07/2024    and Urine Culture    Lab Results   Component Value Date    LABURIN (A) 03/07/2024     ESCHERICHIA COLI  10,000 - 49,999 cfu/ml  No other significant isolate           Feeding Method: n/a      Immunizations       None            This patient has no babies on file.  Pending Diagnostic Studies:       None            Discharged Condition: good    Disposition: Home or Self Care    Follow Up:   Follow-up Information       Haydee Hebert MD. Go in 2 week(s).    Specialties: Obstetrics and Gynecology, Surgery  Why: has appt scheduled  Contact information:  [address]  [address]  [facility] [address]  [phone]                           Patient Instructions:      Diet Adult Regular     No driving until:   Order Comments: No driving while using narcotic rx     Notify your health care provider if you experience any of the following:  temperature >100.4     Notify your health care provider if you experience any of the following:  persistent nausea and vomiting or diarrhea     Notify your health care provider if you experience any of the following:  severe uncontrolled pain     Notify your health care provider if you experience any of the following:  difficulty breathing or increased cough     Notify your health care provider if you experience any of the following:  increased confusion or weakness     Medications:  Current Discharge Medication List        START taking these medications    Details   cephALEXin (KEFLEX) 500 MG capsule Take 1 capsule (500 mg total) by mouth  every 6 (six) hours. for 7 days  Qty: 28 capsule, Refills: 0      ferrous sulfate (FEOSOL) 325 mg (65 mg iron) Tab tablet Take 1 tablet (325 mg total) by mouth daily with breakfast.  Qty: 90 tablet, Refills: 1      senna-docusate 8.6-50 mg (PERICOLACE) 8.6-50 mg per tablet Take 1 tablet by mouth every evening. for 10 days  Qty: 10 tablet, Refills: 0           STOP taking these medications       ondansetron (ZOFRAN-ODT) 4 MG TbDL Comments:   Reason for Stopping:         prenatal vit103-iron fum-folic () 27 mg iron- 1 mg Tab Comments:   Reason for Stopping:               Abril Suresh MD  Obstetrics  [facility]

## 2024-03-10 NOTE — NURSING
Pt does not have IV access and refuse to be stuck again. Pt. Also refused lab work. Notified on call provider.

## 2024-03-10 NOTE — PROGRESS NOTES
[facility]  Obstetrics  Antepartum Progress Note    Patient Name: Gustavo Bledsoe  MRN: [id number]  Admission Date: 3/7/2024  Hospital Length of Stay: 3 days  Attending Physician: Lizbeth Daigle MD  Primary Care Provider: Swathi, Primary Doctor    Subjective:     Principal Problem:Pyelonephritis affecting pregnancy in second trimester    HPI:   Gustavo Bledsoe is a 33 y.o.  female with IUP at 23w6d weeks gestation ([facility])who presents to ER with complaint of working right flank pain. Reports UTI symptoms for the last 2 days that worsen prompting visit to ER. Denies fever, chills, nausea, vomiting. This IUP is complicated by hx of  delivery, history of previous C/D, history indicated cerclage placement this IUP.  Patient denies abdominal pain, vaginal bleeding or leakage of fluid. Reports active fetal movement. FHT verified per ER provider.          Hospital Course:  Received first dose of ceftriaxone in ER with IVF bolus. Place in observation for second dose and continue IVF hydration. Reports pain better since receiving therapy in ER.  3/8/24-c/o N/V but is able to drink juice this AM. Still reports low right sided back pain, somewhat improved.   2024--continues with rt flank discomfort; e.coli, sens pending, tm 101 at 2000  3/10/24-afebrile for 24 hrs; ucx e.coli; stable for discharge    Obstetric HPI:  Patient reports None contractions, active fetal movement, absent vaginal bleeding , absent loss of fluid      Objective:     Vital Signs (Most Recent):  Temp: 97.6 °F (36.4 °C) (03/10/24 0733)  Pulse: 92 (03/10/24 0800)  Resp: 18 (03/10/24 0850)  BP: (!) 99/54 (03/10/24 0733)  SpO2: 99 % (03/10/24 07) Vital Signs (24h Range):  Temp:  [97.6 °F (36.4 °C)-98.6 °F (37 °C)] 97.6 °F (36.4 °C)  Pulse:  [] 92  Resp:  [15-19] 18  SpO2:  [97 %-99 %] 99 %  BP: ()/(53-61) 99/54     Weight: 71.5 kg (157 lb 10.1 oz)  Body mass index is 25.44 kg/m².    FHT:  +doppler    Intake/Output Summary (Last 24 hours) at 3/10/2024 0943  Last data filed at 3/10/2024 0930  Gross per 24 hour   Intake 3098.91 ml   Output --   Net 3098.91 ml     Cervix deferred    Significant Labs:  Recent Lab Results         03/10/24  0752        Anion Gap 8       BUN 3       Calcium 8.3       Chloride 105       CO2 22       Creatinine 0.6       eGFR >60       Glucose 82       Hematocrit 28.1       Hemoglobin 9.1       MCH 24.8       MCHC 32.4       MCV 77       MPV 9.6       Platelet Count 278       Potassium 3.7       RBC 3.67       RDW 15.5       Sodium 135       WBC 10.61               Physical Exam:   Constitutional: She is oriented to person, place, and time. She appears well-developed and well-nourished.    HENT:   Head: Normocephalic.    Eyes: Pupils are equal, round, and reactive to light. Conjunctivae are normal.     Cardiovascular:  Normal rate and regular rhythm.             Pulmonary/Chest: Effort normal.        Abdominal: Soft.     Genitourinary:    Genitourinary Comments: Gravid, non tender             Musculoskeletal: Normal range of motion.      Comments: Rt cva--less tender       Neurological: She is alert and oriented to person, place, and time. She has normal reflexes.    Skin: Skin is warm and dry.    Psychiatric: She has a normal mood and affect. Her behavior is normal. Judgment and thought content normal.       Review of Systems  Assessment/Plan:     33 y.o. female  at 24w2d for:    * Pyelonephritis affecting pregnancy in second trimester  continue ceftriaxone and IVF hydration. Plan of management discussed with pt  Cultures pending  Tm-101.7 @ 3/8/24 4am  03/10/2024  HD #2  Continue ceftriaxone and iv hydration  Cotninue pain management  Stable for discharge on oral abx once afebrile for 24-48 hrs and e. Coli sensitivities resulted  03/10/2024  HD#3  Stable for discharge        History of  delivery, cerclage in place  - currently stable    Iron deficiency anemia  during pregnancy  - iron while inpatient  03/10/2024  Continue iron          Abril Suresh MD  Obstetrics  [facility]

## 2024-03-10 NOTE — PLAN OF CARE
[facility]  Discharge Final Note    Primary Care Provider: No, Primary Doctor    Expected Discharge Date: 3/10/2024    Final Discharge Note (most recent)       Final Note - 03/10/24 1048          Final Note    Assessment Type Final Discharge Note     Anticipated Discharge Disposition Home or Self Care        Post-Acute Status    Discharge Delays None known at this time                     Important Message from Medicare             Contact Info       Haydee Hebert MD   Specialty: Obstetrics and Gynecology, Surgery    [address]  [address]  [place] [address]   Phone: [phone]       Next Steps: Go in 2 week(s)    Instructions: has appt scheduled          Discharge home, no home health or dme orders noted.

## 2024-03-10 NOTE — SUBJECTIVE & OBJECTIVE
Obstetric HPI:  Patient reports None contractions, active fetal movement, absent vaginal bleeding , absent loss of fluid      Objective:     Vital Signs (Most Recent):  Temp: 97.6 °F (36.4 °C) (03/10/24 0733)  Pulse: 92 (03/10/24 0800)  Resp: 18 (03/10/24 0850)  BP: (!) 99/54 (03/10/24 0733)  SpO2: 99 % (03/10/24 0733) Vital Signs (24h Range):  Temp:  [97.6 °F (36.4 °C)-98.6 °F (37 °C)] 97.6 °F (36.4 °C)  Pulse:  [] 92  Resp:  [15-19] 18  SpO2:  [97 %-99 %] 99 %  BP: ()/(53-61) 99/54     Weight: 71.5 kg (157 lb 10.1 oz)  Body mass index is 25.44 kg/m².    FHT: +doppler    Intake/Output Summary (Last 24 hours) at 3/10/2024 0943  Last data filed at 3/10/2024 0930  Gross per 24 hour   Intake 3098.91 ml   Output --   Net 3098.91 ml     Cervix deferred    Significant Labs:  Recent Lab Results         03/10/24  0752        Anion Gap 8       BUN 3       Calcium 8.3       Chloride 105       CO2 22       Creatinine 0.6       eGFR >60       Glucose 82       Hematocrit 28.1       Hemoglobin 9.1       MCH 24.8       MCHC 32.4       MCV 77       MPV 9.6       Platelet Count 278       Potassium 3.7       RBC 3.67       RDW 15.5       Sodium 135       WBC 10.61               Physical Exam:   Constitutional: She is oriented to person, place, and time. She appears well-developed and well-nourished.    HENT:   Head: Normocephalic.    Eyes: Pupils are equal, round, and reactive to light. Conjunctivae are normal.     Cardiovascular:  Normal rate and regular rhythm.             Pulmonary/Chest: Effort normal.        Abdominal: Soft.     Genitourinary:    Genitourinary Comments: Gravid, non tender             Musculoskeletal: Normal range of motion.      Comments: Rt cva--less tender       Neurological: She is alert and oriented to person, place, and time. She has normal reflexes.    Skin: Skin is warm and dry.    Psychiatric: She has a normal mood and affect. Her behavior is normal. Judgment and thought content normal.        Review of Systems

## 2024-03-10 NOTE — PLAN OF CARE
Call button within reach. PRN pain medication given per MD order. No fever throughout the night.   Problem: Adult Inpatient Plan of Care  Goal: Plan of Care Review  Outcome: Ongoing, Progressing  Goal: Patient-Specific Goal (Individualized)  Outcome: Ongoing, Progressing  Goal: Absence of Hospital-Acquired Illness or Injury  Outcome: Ongoing, Progressing  Goal: Optimal Comfort and Wellbeing  Outcome: Ongoing, Progressing  Goal: Readiness for Transition of Care  Outcome: Ongoing, Progressing     Problem:  Fall Injury Risk  Goal: Absence of Fall, Infant Drop and Related Injury  Outcome: Ongoing, Progressing     Problem: UTI (Urinary Tract Infection)  Goal: Improved Infection Symptoms  Outcome: Ongoing, Progressing

## 2024-03-10 NOTE — ASSESSMENT & PLAN NOTE
continue ceftriaxone and IVF hydration. Plan of management discussed with pt  Cultures pending  Tm-101.7 @ 3/8/24 4am  03/10/2024  HD #2  Continue ceftriaxone and iv hydration  Cotninue pain management  Stable for discharge on oral abx once afebrile for 24-48 hrs and e. Coli sensitivities resulted  03/10/2024  HD#3  Stable for discharge

## 2024-03-12 LAB
BACTERIA BLD CULT: NORMAL
BACTERIA BLD CULT: NORMAL

## 2024-06-10 PROBLEM — O23.02 PYELONEPHRITIS AFFECTING PREGNANCY IN SECOND TRIMESTER: Status: RESOLVED | Noted: 2024-03-07 | Resolved: 2024-06-10

## (undated) DEVICE — Device

## (undated) DEVICE — SEE MEDLINE ITEM 154981

## (undated) DEVICE — SEE L#152161

## (undated) DEVICE — PACK DRAPE PERI/GYN TIBURON

## (undated) DEVICE — DRESSING TELFA N ADH 3X8

## (undated) DEVICE — SEE MEDLINE ITEM 157181

## (undated) DEVICE — GLOVE PROTEXIS HYDROGEL SZ7.5

## (undated) DEVICE — CONTAINER SPECIMEN STRL 4OZ

## (undated) DEVICE — SYR 10CC LUER LOCK

## (undated) DEVICE — SEE MEDLINE ITEM 152739

## (undated) DEVICE — COVER OVERHEAD SURG LT BLUE

## (undated) DEVICE — SEE MEDLINE ITEM 157027

## (undated) DEVICE — SOL NS 1000CC

## (undated) DEVICE — SEE MEDLINE ITEM 152622

## (undated) DEVICE — MANIFOLD 4 PORT

## (undated) DEVICE — TUBING COLLECTION PVC D AND C

## (undated) DEVICE — CATH 16FR URETHRL RED RUB